# Patient Record
Sex: MALE | Race: WHITE | NOT HISPANIC OR LATINO
[De-identification: names, ages, dates, MRNs, and addresses within clinical notes are randomized per-mention and may not be internally consistent; named-entity substitution may affect disease eponyms.]

---

## 2017-02-10 ENCOUNTER — PRIOR ORIGINAL RECORDS (OUTPATIENT)
Dept: OTHER | Age: 52
End: 2017-02-10

## 2017-02-14 ENCOUNTER — PRIOR ORIGINAL RECORDS (OUTPATIENT)
Dept: OTHER | Age: 52
End: 2017-02-14

## 2017-02-14 LAB
ALBUMIN: 4.7 G/DL
ALKALINE PHOSPHATATE(ALK PHOS): 76 IU/L
ALT (SGPT): 43 U/L
ALT (SGPT): 43 U/L
AST (SGOT): 23 U/L
AST (SGOT): 23 U/L
BUN: 21 MG/DL
CALCIUM: 9.7 MG/DL
CHLORIDE: 98 MEQ/L
CHOLESTEROL, TOTAL: 227 MG/DL
CREATININE, SERUM: 1.11 MG/DL
GLUCOSE: 101 MG/DL
GLUCOSE: 101 MG/DL
HDL CHOLESTEROL: 52 MG/DL
HEMATOCRIT: 47.3 %
HEMOGLOBIN: 15.7 G/DL
LDL CHOLESTEROL: 142 MG/DL
NON-HDL CHOLESTEROL: 175 MG/DL
PLATELETS: 339 K/UL
POTASSIUM, SERUM: 4.6 MEQ/L
PROTEIN, TOTAL: 7.8 G/DL
RED BLOOD COUNT: 5.8 X 10-6/U
SGOT (AST): 23 IU/L
SGPT (ALT): 43 IU/L
SODIUM: 137 MEQ/L
THYROID STIMULATING HORMONE: 1.45 MLU/L
THYROID STIMULATING HORMONE: 1.45 MLU/L
TRIGLYCERIDES: 165 MG/DL
VITAMIN D 25-OH: 26.5 NG/ML
WHITE BLOOD COUNT: 10.16 X 10-3/U

## 2017-02-17 ENCOUNTER — TELEPHONE (OUTPATIENT)
Dept: SURGERY | Facility: CLINIC | Age: 52
End: 2017-02-17

## 2017-02-17 NOTE — TELEPHONE ENCOUNTER
Per 's request, provided him with pt's faxed test results (see pt's \"my Chart. \"), as well as placed copy to scan in. No fov scheduled.  asked that I mail pt copy of u/a requisition order, that he may use at his own lab.  Placed this in outgoin

## 2017-02-17 NOTE — TELEPHONE ENCOUNTER
I sent patient the following message in \"my chart\";   it was in response to a message that he sent me. ..     Erma Rae,          On 2/10/17   your urinalysis dipstick (presumably performed a laboratory near your home in [place]) showed a small amount of micro

## 2017-05-09 ENCOUNTER — PRIOR ORIGINAL RECORDS (OUTPATIENT)
Dept: OTHER | Age: 52
End: 2017-05-09

## 2017-12-04 NOTE — TELEPHONE ENCOUNTER
Pending Prescriptions Disp Refills    PANTOPRAZOLE SODIUM 40 MG Oral Tab EC [Pharmacy Med Name: PANTOPRAZOLE SOD 40MG EC TABLET] 90 tablet      Sig: TAKE 1 TABLET BY MOUTH  EVERY MORNING BEFORE  BREAKFAST         See refill request  Patient last seen 10-

## 2017-12-05 RX ORDER — PANTOPRAZOLE SODIUM 40 MG/1
40 TABLET, DELAYED RELEASE ORAL
Qty: 90 TABLET | Refills: 0 | Status: SHIPPED | OUTPATIENT
Start: 2017-12-05 | End: 2018-02-23

## 2017-12-05 NOTE — TELEPHONE ENCOUNTER
Rx refilled for 90 days. Patient has not been seen in >1 year. Would advise an annual office visit for further refills. Alternatively, patient may seek additional refills from his PCP.

## 2018-01-30 ENCOUNTER — PRIOR ORIGINAL RECORDS (OUTPATIENT)
Dept: OTHER | Age: 53
End: 2018-01-30

## 2018-02-26 RX ORDER — PANTOPRAZOLE SODIUM 40 MG/1
TABLET, DELAYED RELEASE ORAL
Qty: 30 TABLET | Refills: 1 | Status: SHIPPED | OUTPATIENT
Start: 2018-02-26 | End: 2018-05-08

## 2018-02-26 NOTE — TELEPHONE ENCOUNTER
I called and spoke to pt, I asked him to verify his , and he refused since my number comes up as a call from Jos and he is not sure if I am who I say I am.   I apologized that I could not give him any information if he didn't verify  or any other

## 2018-02-26 NOTE — TELEPHONE ENCOUNTER
Rx refilled for 30 days +1 refill. Please see previous telephone template dated 35/0/7239. He has been >1 year since the patient was seen. Would recommend an annual office visit with the patient may alternatively seek additional refills from his PCP.

## 2018-02-26 NOTE — TELEPHONE ENCOUNTER
Pending Prescriptions Disp Refills    PANTOPRAZOLE SODIUM 40 MG Oral Tab EC [Pharmacy Med Name: PANTOPRAZOLE SOD 40MG EC TABLET] 90 tablet      Sig: TAKE 1 TABLET BY MOUTH  EVERY MORNING BEFORE  BREAKFAST         LOV: 12/3/17   LR: 12/5/17

## 2018-03-09 ENCOUNTER — PRIOR ORIGINAL RECORDS (OUTPATIENT)
Dept: OTHER | Age: 53
End: 2018-03-09

## 2018-03-20 ENCOUNTER — PRIOR ORIGINAL RECORDS (OUTPATIENT)
Dept: OTHER | Age: 53
End: 2018-03-20

## 2018-03-21 LAB
ALBUMIN: 4.6 G/DL
ALT (SGPT): 36 U/L
AST (SGOT): 21 U/L
BILIRUBIN TOTAL: 1.2 MG/DL
BILIRUBIN TOTAL: 1.2 MG/DL
BUN: 22 MG/DL
CALCIUM: 9.5 MG/DL
CHLORIDE: 100 MEQ/L
CHOLESTEROL, TOTAL: 172 MG/DL
CREATININE, SERUM: 0.98 MG/DL
GLUCOSE: 90 MG/DL
GLUCOSE: 90 MG/DL
HDL CHOLESTEROL: 53 MG/DL
HEMATOCRIT: 44.9 %
HEMATOCRIT: 44.9 %
HEMOGLOBIN: 14.7 G/DL
HEMOGLOBIN: 14.7 G/DL
INR: 1.1
LDL CHOLESTEROL: 99 MG/DL
NON-HDL CHOLESTEROL: 119 MG/DL
PLATELETS: 299 K/UL
PLATELETS: 299 K/UL
POTASSIUM, SERUM: 4.1 MEQ/L
PROTEIN, TOTAL: 7.4 G/DL
RED BLOOD COUNT: 5.52 X 10-6/U
SGOT (AST): 21 IU/L
SGPT (ALT): 36 IU/L
SODIUM: 139 MEQ/L
TRIGLYCERIDES: 102 MG/DL
WHITE BLOOD COUNT: 8.73 X 10-3/U

## 2018-04-02 ENCOUNTER — ANESTHESIA EVENT (OUTPATIENT)
Dept: SURGERY | Facility: HOSPITAL | Age: 53
DRG: 470 | End: 2018-04-02
Payer: COMMERCIAL

## 2018-04-04 ENCOUNTER — ANESTHESIA (OUTPATIENT)
Dept: SURGERY | Facility: HOSPITAL | Age: 53
DRG: 470 | End: 2018-04-04
Payer: COMMERCIAL

## 2018-04-04 ENCOUNTER — APPOINTMENT (OUTPATIENT)
Dept: GENERAL RADIOLOGY | Facility: HOSPITAL | Age: 53
DRG: 470 | End: 2018-04-04
Attending: NURSE PRACTITIONER
Payer: COMMERCIAL

## 2018-04-04 ENCOUNTER — SURGERY (OUTPATIENT)
Age: 53
End: 2018-04-04

## 2018-04-04 ENCOUNTER — APPOINTMENT (OUTPATIENT)
Dept: GENERAL RADIOLOGY | Facility: HOSPITAL | Age: 53
DRG: 470 | End: 2018-04-04
Attending: ORTHOPAEDIC SURGERY
Payer: COMMERCIAL

## 2018-04-04 ENCOUNTER — HOSPITAL ENCOUNTER (INPATIENT)
Facility: HOSPITAL | Age: 53
LOS: 3 days | Discharge: HOME HEALTH | DRG: 470 | End: 2018-04-07
Attending: ORTHOPAEDIC SURGERY | Admitting: ORTHOPAEDIC SURGERY
Payer: COMMERCIAL

## 2018-04-04 PROBLEM — M16.11 PRIMARY OSTEOARTHRITIS OF RIGHT HIP: Chronic | Status: ACTIVE | Noted: 2018-04-04

## 2018-04-04 PROBLEM — J45.909 ASTHMA (HCC): Chronic | Status: ACTIVE | Noted: 2018-04-04

## 2018-04-04 PROBLEM — J45.909 ASTHMA: Chronic | Status: ACTIVE | Noted: 2018-04-04

## 2018-04-04 PROBLEM — I48.0 PAROXYSMAL A-FIB (HCC): Chronic | Status: ACTIVE | Noted: 2018-04-04

## 2018-04-04 PROBLEM — M16.11 ARTHRITIS OF RIGHT HIP: Status: ACTIVE | Noted: 2018-04-04

## 2018-04-04 PROBLEM — E78.5 HYPERLIPIDEMIA: Chronic | Status: ACTIVE | Noted: 2018-04-04

## 2018-04-04 PROBLEM — I10 ESSENTIAL HYPERTENSION: Chronic | Status: ACTIVE | Noted: 2018-04-04

## 2018-04-04 PROCEDURE — 72170 X-RAY EXAM OF PELVIS: CPT | Performed by: NURSE PRACTITIONER

## 2018-04-04 PROCEDURE — 73501 X-RAY EXAM HIP UNI 1 VIEW: CPT | Performed by: ORTHOPAEDIC SURGERY

## 2018-04-04 PROCEDURE — 0SR90J9 REPLACEMENT OF RIGHT HIP JOINT WITH SYNTHETIC SUBSTITUTE, CEMENTED, OPEN APPROACH: ICD-10-PCS | Performed by: ORTHOPAEDIC SURGERY

## 2018-04-04 PROCEDURE — 99232 SBSQ HOSP IP/OBS MODERATE 35: CPT | Performed by: HOSPITALIST

## 2018-04-04 DEVICE — CEMENT BONE ZIM PALICOS R +G: Type: IMPLANTABLE DEVICE | Site: HIP | Status: FUNCTIONAL

## 2018-04-04 DEVICE — BONE SCREW 6.5X30 SELF-TAP: Type: IMPLANTABLE DEVICE | Site: HIP | Status: FUNCTIONAL

## 2018-04-04 DEVICE — BIOLOX® DELTA, CERAMIC FEMORAL HEAD, L, Ø 36/+3.5, TAPER 12/14
Type: IMPLANTABLE DEVICE | Site: HIP | Status: FUNCTIONAL
Brand: BIOLOX® DELTA

## 2018-04-04 DEVICE — BONE SCREW 6.5X35 SELF-TAP: Type: IMPLANTABLE DEVICE | Site: HIP | Status: FUNCTIONAL

## 2018-04-04 DEVICE — IMPLANTABLE DEVICE: Type: IMPLANTABLE DEVICE | Site: HIP | Status: FUNCTIONAL

## 2018-04-04 RX ORDER — METOPROLOL SUCCINATE 25 MG/1
25 TABLET, EXTENDED RELEASE ORAL DAILY
Status: DISCONTINUED | OUTPATIENT
Start: 2018-04-04 | End: 2018-04-04

## 2018-04-04 RX ORDER — SCOLOPAMINE TRANSDERMAL SYSTEM 1 MG/1
1 PATCH, EXTENDED RELEASE TRANSDERMAL ONCE
Status: COMPLETED | OUTPATIENT
Start: 2018-04-04 | End: 2018-04-07

## 2018-04-04 RX ORDER — SODIUM CHLORIDE 9 MG/ML
INJECTION, SOLUTION INTRAVENOUS CONTINUOUS PRN
Status: DISCONTINUED | OUTPATIENT
Start: 2018-04-04 | End: 2018-04-04 | Stop reason: SURG

## 2018-04-04 RX ORDER — TRAMADOL HYDROCHLORIDE 50 MG/1
100 TABLET ORAL EVERY 6 HOURS PRN
Status: DISCONTINUED | OUTPATIENT
Start: 2018-04-04 | End: 2018-04-07

## 2018-04-04 RX ORDER — MORPHINE SULFATE 4 MG/ML
4 INJECTION, SOLUTION INTRAMUSCULAR; INTRAVENOUS EVERY 2 HOUR PRN
Status: DISCONTINUED | OUTPATIENT
Start: 2018-04-04 | End: 2018-04-07

## 2018-04-04 RX ORDER — NALOXONE HYDROCHLORIDE 0.4 MG/ML
0.08 INJECTION, SOLUTION INTRAMUSCULAR; INTRAVENOUS; SUBCUTANEOUS
Status: ACTIVE | OUTPATIENT
Start: 2018-04-04 | End: 2018-04-05

## 2018-04-04 RX ORDER — CLINDAMYCIN PHOSPHATE 150 MG/ML
INJECTION, SOLUTION INTRAVENOUS AS NEEDED
Status: DISCONTINUED | OUTPATIENT
Start: 2018-04-04 | End: 2018-04-04 | Stop reason: SURG

## 2018-04-04 RX ORDER — HYDROCODONE BITARTRATE AND ACETAMINOPHEN 5; 325 MG/1; MG/1
2 TABLET ORAL AS NEEDED
Status: DISCONTINUED | OUTPATIENT
Start: 2018-04-04 | End: 2018-04-04 | Stop reason: HOSPADM

## 2018-04-04 RX ORDER — NALOXONE HYDROCHLORIDE 0.4 MG/ML
80 INJECTION, SOLUTION INTRAMUSCULAR; INTRAVENOUS; SUBCUTANEOUS AS NEEDED
Status: DISCONTINUED | OUTPATIENT
Start: 2018-04-04 | End: 2018-04-04 | Stop reason: HOSPADM

## 2018-04-04 RX ORDER — FAMOTIDINE 20 MG/1
20 TABLET ORAL 2 TIMES DAILY
Status: DISCONTINUED | OUTPATIENT
Start: 2018-04-04 | End: 2018-04-07

## 2018-04-04 RX ORDER — SODIUM CHLORIDE, SODIUM LACTATE, POTASSIUM CHLORIDE, CALCIUM CHLORIDE 600; 310; 30; 20 MG/100ML; MG/100ML; MG/100ML; MG/100ML
INJECTION, SOLUTION INTRAVENOUS CONTINUOUS
Status: DISCONTINUED | OUTPATIENT
Start: 2018-04-04 | End: 2018-04-04 | Stop reason: HOSPADM

## 2018-04-04 RX ORDER — SODIUM PHOSPHATE, DIBASIC AND SODIUM PHOSPHATE, MONOBASIC 7; 19 G/133ML; G/133ML
1 ENEMA RECTAL ONCE AS NEEDED
Status: DISCONTINUED | OUTPATIENT
Start: 2018-04-04 | End: 2018-04-07

## 2018-04-04 RX ORDER — ACETAMINOPHEN 325 MG/1
650 TABLET ORAL EVERY 4 HOURS PRN
Status: DISCONTINUED | OUTPATIENT
Start: 2018-04-04 | End: 2018-04-07

## 2018-04-04 RX ORDER — MIDAZOLAM HYDROCHLORIDE 1 MG/ML
INJECTION INTRAMUSCULAR; INTRAVENOUS AS NEEDED
Status: DISCONTINUED | OUTPATIENT
Start: 2018-04-04 | End: 2018-04-04 | Stop reason: SURG

## 2018-04-04 RX ORDER — METOPROLOL TARTRATE 5 MG/5ML
2.5 INJECTION INTRAVENOUS ONCE
Status: DISCONTINUED | OUTPATIENT
Start: 2018-04-04 | End: 2018-04-04 | Stop reason: HOSPADM

## 2018-04-04 RX ORDER — SODIUM CHLORIDE 0.9 % (FLUSH) 0.9 %
10 SYRINGE (ML) INJECTION AS NEEDED
Status: DISCONTINUED | OUTPATIENT
Start: 2018-04-04 | End: 2018-04-07

## 2018-04-04 RX ORDER — DIPHENHYDRAMINE HCL 25 MG
25 CAPSULE ORAL EVERY 4 HOURS PRN
Status: ACTIVE | OUTPATIENT
Start: 2018-04-04 | End: 2018-04-05

## 2018-04-04 RX ORDER — MAGNESIUM HYDROXIDE 1200 MG/15ML
LIQUID ORAL CONTINUOUS PRN
Status: DISCONTINUED | OUTPATIENT
Start: 2018-04-04 | End: 2018-04-04

## 2018-04-04 RX ORDER — HETASTARCH 6 G/100ML
500 INJECTION, SOLUTION INTRAVENOUS ONCE
Status: COMPLETED | OUTPATIENT
Start: 2018-04-04 | End: 2018-04-04

## 2018-04-04 RX ORDER — MORPHINE SULFATE 2 MG/ML
2 INJECTION, SOLUTION INTRAMUSCULAR; INTRAVENOUS EVERY 2 HOUR PRN
Status: DISCONTINUED | OUTPATIENT
Start: 2018-04-04 | End: 2018-04-07

## 2018-04-04 RX ORDER — TRAMADOL HYDROCHLORIDE 50 MG/1
50 TABLET ORAL EVERY 6 HOURS PRN
Status: DISCONTINUED | OUTPATIENT
Start: 2018-04-04 | End: 2018-04-07

## 2018-04-04 RX ORDER — MORPHINE SULFATE 10 MG/ML
6 INJECTION, SOLUTION INTRAMUSCULAR; INTRAVENOUS EVERY 10 MIN PRN
Status: DISCONTINUED | OUTPATIENT
Start: 2018-04-04 | End: 2018-04-04 | Stop reason: HOSPADM

## 2018-04-04 RX ORDER — HALOPERIDOL 5 MG/ML
0.5 INJECTION INTRAMUSCULAR ONCE AS NEEDED
Status: ACTIVE | OUTPATIENT
Start: 2018-04-04 | End: 2018-04-04

## 2018-04-04 RX ORDER — METOCLOPRAMIDE 10 MG/1
10 TABLET ORAL ONCE
Status: DISCONTINUED | OUTPATIENT
Start: 2018-04-04 | End: 2018-04-04 | Stop reason: HOSPADM

## 2018-04-04 RX ORDER — BUPIVACAINE HYDROCHLORIDE 5 MG/ML
INJECTION, SOLUTION EPIDURAL; INTRACAUDAL AS NEEDED
Status: DISCONTINUED | OUTPATIENT
Start: 2018-04-04 | End: 2018-04-04 | Stop reason: SURG

## 2018-04-04 RX ORDER — METOPROLOL SUCCINATE 25 MG/1
25 TABLET, EXTENDED RELEASE ORAL DAILY
Status: DISCONTINUED | OUTPATIENT
Start: 2018-04-04 | End: 2018-04-05

## 2018-04-04 RX ORDER — ATORVASTATIN CALCIUM 40 MG/1
40 TABLET, FILM COATED ORAL NIGHTLY
Status: DISCONTINUED | OUTPATIENT
Start: 2018-04-04 | End: 2018-04-07

## 2018-04-04 RX ORDER — POLYETHYLENE GLYCOL 3350 17 G/17G
17 POWDER, FOR SOLUTION ORAL DAILY PRN
Status: DISCONTINUED | OUTPATIENT
Start: 2018-04-04 | End: 2018-04-07

## 2018-04-04 RX ORDER — FLECAINIDE ACETATE 100 MG/1
100 TABLET ORAL 2 TIMES DAILY
Status: DISCONTINUED | OUTPATIENT
Start: 2018-04-04 | End: 2018-04-07

## 2018-04-04 RX ORDER — HYDROMORPHONE HYDROCHLORIDE 1 MG/ML
0.6 INJECTION, SOLUTION INTRAMUSCULAR; INTRAVENOUS; SUBCUTANEOUS EVERY 5 MIN PRN
Status: DISCONTINUED | OUTPATIENT
Start: 2018-04-04 | End: 2018-04-04 | Stop reason: HOSPADM

## 2018-04-04 RX ORDER — HYDROCODONE BITARTRATE AND ACETAMINOPHEN 7.5; 325 MG/1; MG/1
2 TABLET ORAL EVERY 6 HOURS PRN
Status: DISCONTINUED | OUTPATIENT
Start: 2018-04-04 | End: 2018-04-06

## 2018-04-04 RX ORDER — DIPHENHYDRAMINE HYDROCHLORIDE 50 MG/ML
12.5 INJECTION INTRAMUSCULAR; INTRAVENOUS EVERY 4 HOURS PRN
Status: ACTIVE | OUTPATIENT
Start: 2018-04-04 | End: 2018-04-05

## 2018-04-04 RX ORDER — HYDROCODONE BITARTRATE AND ACETAMINOPHEN 7.5; 325 MG/1; MG/1
1 TABLET ORAL EVERY 6 HOURS PRN
Status: DISCONTINUED | OUTPATIENT
Start: 2018-04-04 | End: 2018-04-06

## 2018-04-04 RX ORDER — ACETAMINOPHEN 325 MG/1
650 TABLET ORAL EVERY 6 HOURS PRN
Status: DISCONTINUED | OUTPATIENT
Start: 2018-04-04 | End: 2018-04-07

## 2018-04-04 RX ORDER — HYDROMORPHONE HYDROCHLORIDE 1 MG/ML
0.2 INJECTION, SOLUTION INTRAMUSCULAR; INTRAVENOUS; SUBCUTANEOUS EVERY 5 MIN PRN
Status: DISCONTINUED | OUTPATIENT
Start: 2018-04-04 | End: 2018-04-04 | Stop reason: HOSPADM

## 2018-04-04 RX ORDER — MORPHINE SULFATE 2 MG/ML
2 INJECTION, SOLUTION INTRAMUSCULAR; INTRAVENOUS EVERY 10 MIN PRN
Status: DISCONTINUED | OUTPATIENT
Start: 2018-04-04 | End: 2018-04-04 | Stop reason: HOSPADM

## 2018-04-04 RX ORDER — GLYCOPYRROLATE 0.2 MG/ML
INJECTION INTRAMUSCULAR; INTRAVENOUS AS NEEDED
Status: DISCONTINUED | OUTPATIENT
Start: 2018-04-04 | End: 2018-04-04 | Stop reason: SURG

## 2018-04-04 RX ORDER — MORPHINE SULFATE 2 MG/ML
1 INJECTION, SOLUTION INTRAMUSCULAR; INTRAVENOUS EVERY 2 HOUR PRN
Status: DISCONTINUED | OUTPATIENT
Start: 2018-04-04 | End: 2018-04-07

## 2018-04-04 RX ORDER — ZOLPIDEM TARTRATE 5 MG/1
5 TABLET ORAL NIGHTLY PRN
Status: DISCONTINUED | OUTPATIENT
Start: 2018-04-04 | End: 2018-04-07

## 2018-04-04 RX ORDER — ONDANSETRON 2 MG/ML
4 INJECTION INTRAMUSCULAR; INTRAVENOUS EVERY 4 HOURS PRN
Status: DISCONTINUED | OUTPATIENT
Start: 2018-04-04 | End: 2018-04-07

## 2018-04-04 RX ORDER — LIDOCAINE HYDROCHLORIDE 10 MG/ML
INJECTION, SOLUTION EPIDURAL; INFILTRATION; INTRACAUDAL; PERINEURAL AS NEEDED
Status: DISCONTINUED | OUTPATIENT
Start: 2018-04-04 | End: 2018-04-04 | Stop reason: SURG

## 2018-04-04 RX ORDER — HYDROMORPHONE HYDROCHLORIDE 1 MG/ML
0.4 INJECTION, SOLUTION INTRAMUSCULAR; INTRAVENOUS; SUBCUTANEOUS
Status: ACTIVE | OUTPATIENT
Start: 2018-04-04 | End: 2018-04-05

## 2018-04-04 RX ORDER — NALBUPHINE HCL 10 MG/ML
2.5 AMPUL (ML) INJECTION EVERY 4 HOURS PRN
Status: DISCONTINUED | OUTPATIENT
Start: 2018-04-04 | End: 2018-04-07

## 2018-04-04 RX ORDER — EPHEDRINE SULFATE 50 MG/ML
INJECTION, SOLUTION INTRAVENOUS AS NEEDED
Status: DISCONTINUED | OUTPATIENT
Start: 2018-04-04 | End: 2018-04-04 | Stop reason: SURG

## 2018-04-04 RX ORDER — ONDANSETRON 2 MG/ML
4 INJECTION INTRAMUSCULAR; INTRAVENOUS ONCE AS NEEDED
Status: DISCONTINUED | OUTPATIENT
Start: 2018-04-04 | End: 2018-04-04 | Stop reason: HOSPADM

## 2018-04-04 RX ORDER — METOCLOPRAMIDE HYDROCHLORIDE 5 MG/ML
INJECTION INTRAMUSCULAR; INTRAVENOUS AS NEEDED
Status: DISCONTINUED | OUTPATIENT
Start: 2018-04-04 | End: 2018-04-04 | Stop reason: SURG

## 2018-04-04 RX ORDER — MORPHINE SULFATE 1 MG/ML
INJECTION, SOLUTION EPIDURAL; INTRATHECAL; INTRAVENOUS AS NEEDED
Status: DISCONTINUED | OUTPATIENT
Start: 2018-04-04 | End: 2018-04-04 | Stop reason: SURG

## 2018-04-04 RX ORDER — HYDROCODONE BITARTRATE AND ACETAMINOPHEN 5; 325 MG/1; MG/1
1 TABLET ORAL AS NEEDED
Status: DISCONTINUED | OUTPATIENT
Start: 2018-04-04 | End: 2018-04-04 | Stop reason: HOSPADM

## 2018-04-04 RX ORDER — SODIUM CHLORIDE 9 MG/ML
INJECTION, SOLUTION INTRAVENOUS CONTINUOUS
Status: DISCONTINUED | OUTPATIENT
Start: 2018-04-04 | End: 2018-04-07

## 2018-04-04 RX ORDER — CELECOXIB 200 MG/1
200 CAPSULE ORAL ONCE
Status: COMPLETED | OUTPATIENT
Start: 2018-04-04 | End: 2018-04-04

## 2018-04-04 RX ORDER — SODIUM CHLORIDE, SODIUM LACTATE, POTASSIUM CHLORIDE, CALCIUM CHLORIDE 600; 310; 30; 20 MG/100ML; MG/100ML; MG/100ML; MG/100ML
INJECTION, SOLUTION INTRAVENOUS CONTINUOUS
Status: DISCONTINUED | OUTPATIENT
Start: 2018-04-04 | End: 2018-04-06

## 2018-04-04 RX ORDER — METOCLOPRAMIDE HYDROCHLORIDE 5 MG/ML
10 INJECTION INTRAMUSCULAR; INTRAVENOUS EVERY 6 HOURS PRN
Status: ACTIVE | OUTPATIENT
Start: 2018-04-04 | End: 2018-04-06

## 2018-04-04 RX ORDER — MORPHINE SULFATE 4 MG/ML
4 INJECTION, SOLUTION INTRAMUSCULAR; INTRAVENOUS EVERY 10 MIN PRN
Status: DISCONTINUED | OUTPATIENT
Start: 2018-04-04 | End: 2018-04-04 | Stop reason: HOSPADM

## 2018-04-04 RX ORDER — HYDROMORPHONE HYDROCHLORIDE 1 MG/ML
0.4 INJECTION, SOLUTION INTRAMUSCULAR; INTRAVENOUS; SUBCUTANEOUS EVERY 5 MIN PRN
Status: DISCONTINUED | OUTPATIENT
Start: 2018-04-04 | End: 2018-04-04 | Stop reason: HOSPADM

## 2018-04-04 RX ORDER — HYDROMORPHONE HYDROCHLORIDE 1 MG/ML
0.6 INJECTION, SOLUTION INTRAMUSCULAR; INTRAVENOUS; SUBCUTANEOUS
Status: ACTIVE | OUTPATIENT
Start: 2018-04-04 | End: 2018-04-05

## 2018-04-04 RX ORDER — FAMOTIDINE 20 MG/1
20 TABLET ORAL ONCE
Status: DISCONTINUED | OUTPATIENT
Start: 2018-04-04 | End: 2018-04-04 | Stop reason: HOSPADM

## 2018-04-04 RX ORDER — ONDANSETRON 2 MG/ML
INJECTION INTRAMUSCULAR; INTRAVENOUS AS NEEDED
Status: DISCONTINUED | OUTPATIENT
Start: 2018-04-04 | End: 2018-04-04 | Stop reason: SURG

## 2018-04-04 RX ORDER — BISACODYL 10 MG
10 SUPPOSITORY, RECTAL RECTAL
Status: DISCONTINUED | OUTPATIENT
Start: 2018-04-04 | End: 2018-04-07

## 2018-04-04 RX ORDER — KETOROLAC TROMETHAMINE 30 MG/ML
30 INJECTION, SOLUTION INTRAMUSCULAR; INTRAVENOUS ONCE
Status: COMPLETED | OUTPATIENT
Start: 2018-04-04 | End: 2018-04-04

## 2018-04-04 RX ORDER — CLINDAMYCIN PHOSPHATE 900 MG/50ML
900 INJECTION INTRAVENOUS ONCE
Status: DISCONTINUED | OUTPATIENT
Start: 2018-04-04 | End: 2018-04-04 | Stop reason: HOSPADM

## 2018-04-04 RX ORDER — FAMOTIDINE 10 MG/ML
20 INJECTION, SOLUTION INTRAVENOUS 2 TIMES DAILY
Status: DISCONTINUED | OUTPATIENT
Start: 2018-04-04 | End: 2018-04-07

## 2018-04-04 RX ORDER — BUPIVACAINE HYDROCHLORIDE 2.5 MG/ML
INJECTION, SOLUTION EPIDURAL; INFILTRATION; INTRACAUDAL AS NEEDED
Status: DISCONTINUED | OUTPATIENT
Start: 2018-04-04 | End: 2018-04-04 | Stop reason: HOSPADM

## 2018-04-04 RX ORDER — DOCUSATE SODIUM 100 MG/1
100 CAPSULE, LIQUID FILLED ORAL 2 TIMES DAILY
Status: DISCONTINUED | OUTPATIENT
Start: 2018-04-04 | End: 2018-04-07

## 2018-04-04 RX ORDER — PANTOPRAZOLE SODIUM 40 MG/1
40 TABLET, DELAYED RELEASE ORAL
Status: DISCONTINUED | OUTPATIENT
Start: 2018-04-05 | End: 2018-04-07

## 2018-04-04 RX ORDER — ONDANSETRON 2 MG/ML
4 INJECTION INTRAMUSCULAR; INTRAVENOUS ONCE AS NEEDED
Status: ACTIVE | OUTPATIENT
Start: 2018-04-04 | End: 2018-04-04

## 2018-04-04 RX ORDER — ACETAMINOPHEN 500 MG
1000 TABLET ORAL ONCE
Status: COMPLETED | OUTPATIENT
Start: 2018-04-04 | End: 2018-04-04

## 2018-04-04 RX ADMIN — LIDOCAINE HYDROCHLORIDE 50 MG: 10 INJECTION, SOLUTION EPIDURAL; INFILTRATION; INTRACAUDAL; PERINEURAL at 12:15:00

## 2018-04-04 RX ADMIN — SODIUM CHLORIDE, SODIUM LACTATE, POTASSIUM CHLORIDE, CALCIUM CHLORIDE: 600; 310; 30; 20 INJECTION, SOLUTION INTRAVENOUS at 15:00:00

## 2018-04-04 RX ADMIN — EPHEDRINE SULFATE 5 MG: 50 INJECTION, SOLUTION INTRAVENOUS at 12:50:00

## 2018-04-04 RX ADMIN — BUPIVACAINE HYDROCHLORIDE 2.1 ML: 5 INJECTION, SOLUTION EPIDURAL; INTRACAUDAL at 12:07:00

## 2018-04-04 RX ADMIN — SODIUM CHLORIDE, SODIUM LACTATE, POTASSIUM CHLORIDE, CALCIUM CHLORIDE: 600; 310; 30; 20 INJECTION, SOLUTION INTRAVENOUS at 13:45:00

## 2018-04-04 RX ADMIN — CLINDAMYCIN PHOSPHATE 900 MG: 150 INJECTION, SOLUTION INTRAVENOUS at 12:22:00

## 2018-04-04 RX ADMIN — MIDAZOLAM HYDROCHLORIDE 2 MG: 1 INJECTION INTRAMUSCULAR; INTRAVENOUS at 12:02:00

## 2018-04-04 RX ADMIN — ONDANSETRON 4 MG: 2 INJECTION INTRAMUSCULAR; INTRAVENOUS at 12:56:00

## 2018-04-04 RX ADMIN — LIDOCAINE HYDROCHLORIDE 25 MG: 10 INJECTION, SOLUTION EPIDURAL; INFILTRATION; INTRACAUDAL; PERINEURAL at 12:04:00

## 2018-04-04 RX ADMIN — SODIUM CHLORIDE: 9 INJECTION, SOLUTION INTRAVENOUS at 15:00:00

## 2018-04-04 RX ADMIN — METOCLOPRAMIDE HYDROCHLORIDE 5 MG: 5 INJECTION INTRAMUSCULAR; INTRAVENOUS at 12:52:00

## 2018-04-04 RX ADMIN — SODIUM CHLORIDE, SODIUM LACTATE, POTASSIUM CHLORIDE, CALCIUM CHLORIDE: 600; 310; 30; 20 INJECTION, SOLUTION INTRAVENOUS at 11:50:00

## 2018-04-04 RX ADMIN — MIDAZOLAM HYDROCHLORIDE 2 MG: 1 INJECTION INTRAMUSCULAR; INTRAVENOUS at 11:56:00

## 2018-04-04 RX ADMIN — SODIUM CHLORIDE, SODIUM LACTATE, POTASSIUM CHLORIDE, CALCIUM CHLORIDE: 600; 310; 30; 20 INJECTION, SOLUTION INTRAVENOUS at 12:37:00

## 2018-04-04 RX ADMIN — GLYCOPYRROLATE 0.1 MG: 0.2 INJECTION INTRAMUSCULAR; INTRAVENOUS at 11:56:00

## 2018-04-04 RX ADMIN — MORPHINE SULFATE 0.3 MG: 1 INJECTION, SOLUTION EPIDURAL; INTRATHECAL; INTRAVENOUS at 12:07:00

## 2018-04-04 RX ADMIN — EPHEDRINE SULFATE 5 MG: 50 INJECTION, SOLUTION INTRAVENOUS at 13:12:00

## 2018-04-04 RX ADMIN — EPHEDRINE SULFATE 5 MG: 50 INJECTION, SOLUTION INTRAVENOUS at 12:56:00

## 2018-04-04 RX ADMIN — SODIUM CHLORIDE: 9 INJECTION, SOLUTION INTRAVENOUS at 12:32:00

## 2018-04-04 NOTE — H&P
History & Physical Examination    Patient Name: Mat Fernandez  MRN: [id number]  CSN: [id number]  YOB: 1965    Diagnosis: right hip arthritis    Present Illness: 53 y/o male with history of right proximal femur fracture followed by multiple philip Diagnosis Date   • Arrhythmia     uses Metoprolol   • Atrial fibrillation [facility])    • Back pain    • Back problem    • BPH (benign prostatic hypertrophy)    • Esophageal reflux    • Extrinsic asthma, unspecified    • History of cystoscopy    • Hypercholes

## 2018-04-04 NOTE — OPERATIVE REPORT
[facility]    Operative Note    Marita Fernandez Patient Status:  Surgery Admit    1965 MRN [id number]   Location [place] [address] Attending Ainsley España MD     PCP Unknown Pcp       Preop DX: right  Hip post-traumat

## 2018-04-04 NOTE — ANESTHESIA POSTPROCEDURE EVALUATION
Patient: Jason Fernandez    Procedure Summary     Date:  04/04/18 Room / Location:  [facility] OR [address] [place] [address] OR    Anesthesia Start:  9588 Anesthesia Stop:  2782    Procedure:  HIP TOTAL REPLACEMENT (Right Hip) Diagnosis:  (Degenerative joint disease  RIGHT

## 2018-04-04 NOTE — ANESTHESIA PREPROCEDURE EVALUATION
Anesthesia PreOp Note    HPI:     Tadeo Fernandez is a 52year old male who presents for preoperative consultation requested by: Liberty Liu MD    Date of Surgery: 4/4/2018    Procedure(s):  HIP TOTAL REPLACEMENT  Indication: Degenerative joint disease Last Rate: 20 mL/hr at 04/04/18 1101    metoprolol Tartrate (LOPRESSOR) tab 25 mg 25 mg Oral Once PRN Sandra Ji MD     famoTIDine (PEPCID) tab 20 mg 20 mg Oral Once Sandra Ji MD     Metoclopramide HCl (REGLAN) tab 10 mg 10 mg Oral Once Natalee Monsivais 98.4 °F (36.9 °C)   TempSrc:  Oral   SpO2:  100%   Weight: 78.5 kg (173 lb) 78.5 kg (173 lb 2 oz)   Height: 1.778 m (5' 10\") 1.778 m (5' 10\")        Anesthesia ROS/Med Hx and Physical Exam     Patient summary reviewed and Nursing notes reviewed    Airway

## 2018-04-04 NOTE — ANESTHESIA PROCEDURE NOTES
Spinal Block  Performed by: SHRADDHA BARBER  Authorized by: Mary Ann Castano     Start Time:  4/4/2018 12:05 PM  End Time:  4/4/2018 12:07 PM  Reason for Block: surgical anesthesia    Anesthesiologist:  SHRADDHA BARBER  Performed by:   Anesthesiologist  aRfaela

## 2018-04-04 NOTE — OPERATIVE REPORT
[facility] - [facility]    Operative Note    Yuridia Delauren Less Patient Status:  Surgery Admit    1965 MRN [id number]   Location [facility] 45 Attending Laura Ochoa MD     PCP Unknown Pcp       Preop DX: right  Hip post-traumat supine on the operating room table. He was then positioned in a lateral decubitus position with the right hip facing up. All bony prominences were well-padded on the left side.   His right lower extremity was then sterilely prepped and draped in standard to the posterior superior quadrant of the cup obtaining good purchase. We then placed a 36 mm trial liner and directed our attention towards the femur. The femur was elevated from the wound in the lateral aspect of the femoral neck was debrided.   We th impingement. We then irrigated with pulsatile lavage again and directed our attention towards closure. CLOSURE:    The capsule short external rotators were closed with #5 Ethibond suture and interfering fashion.   The deep fascia was closed with #2 Quil

## 2018-04-04 NOTE — PROGRESS NOTES
[facility]    Progress Note    Jeannie Fernandez Patient Status:  Surgery Admit    1965 MRN [id number]   Location [address] [facility] Attending Aziza Green MD   Hosp Day # 0 PCP Unknown Pcp     Subjective: CREATSERUM, BUN, NA, K, CL, CO2, GLU, CA, ALB, ALKPHO, BILT, TP, AST, ALT, PTT, INR, PT, T4F, TSH, TSHREFLEX, EAMON, LIP, GGT, PSA, DDIMER, ESRML, ESRPF, CRP, BNP, MG, PHOS, TROP, CK, CKMB, ELDER, RPR, B12, ETOH, POCGLU    Xr Hip W Or Wo Pelvis 1 View, Right (

## 2018-04-05 ENCOUNTER — APPOINTMENT (OUTPATIENT)
Dept: GENERAL RADIOLOGY | Facility: HOSPITAL | Age: 53
DRG: 470 | End: 2018-04-05
Attending: HOSPITALIST
Payer: COMMERCIAL

## 2018-04-05 PROCEDURE — 99233 SBSQ HOSP IP/OBS HIGH 50: CPT | Performed by: HOSPITALIST

## 2018-04-05 PROCEDURE — 71046 X-RAY EXAM CHEST 2 VIEWS: CPT | Performed by: HOSPITALIST

## 2018-04-05 RX ORDER — SULFAMETHOXAZOLE AND TRIMETHOPRIM 800; 160 MG/1; MG/1
1 TABLET ORAL EVERY 12 HOURS SCHEDULED
Status: DISCONTINUED | OUTPATIENT
Start: 2018-04-05 | End: 2018-04-07

## 2018-04-05 RX ORDER — METOPROLOL SUCCINATE 25 MG/1
25 TABLET, EXTENDED RELEASE ORAL DAILY
Status: DISCONTINUED | OUTPATIENT
Start: 2018-04-05 | End: 2018-04-06

## 2018-04-05 NOTE — OCCUPATIONAL THERAPY NOTE
OCCUPATIONAL THERAPY EVALUATION - INPATIENT      Room Number: 102/567-I  Evaluation Date: 4/5/2018  Type of Evaluation: Initial  Presenting Problem: r thr    Physician Order: IP Consult to Occupational Therapy  Reason for Therapy: ADL/IADL Dysfunction and Hyperlipidemia    Paroxysmal A-fib (HCC)    Asthma    Arthritis of right hip      Past Medical History  Past Medical History:   Diagnosis Date   • Arrhythmia     uses Metoprolol   • Atrial fibrillation [facility])    • Back pain    • Back problem    • BPH (agusto pain    COGNITION  Alert and oriented x 4    RANGE OF MOTION   Upper extremity ROM is within functional limits     STRENGTH ASSESSMENT  Upper extremity strength is within functional limits     ADDITIONAL TESTS                                    NEUROLOGICA will independently recall posterior hip precautions  Comment:         Goals  on:18  Frequency:1 visit

## 2018-04-05 NOTE — CM/SW NOTE
ALLEN met with the pt. And his wife At bedside. The pt. Lives with his wife in a 2 story home with the master on the main level. The pt. Reports being independent prior to admission with adls, ambulation and driving. The pt. Has a dtr. Living in the area.

## 2018-04-05 NOTE — PHYSICAL THERAPY NOTE
PHYSICAL THERAPY HIP EVALUATION - INPATIENT     Room Number: 803/166-S  Evaluation Date: 4/5/2018  Type of Evaluation: Initial  Physician Order: PT Eval and Treat    Presenting Problem: THR RLE  Reason for Therapy: Mobility Dysfunction and Discharge Planni COLONOSCOPY WITH BIOPSY  1979: ORCHIOPEXY        Comment: DETORSION OF TESTICLE  1982: OTHER Left      Comment: INTERNAL FIXATION TIBIA/FIBULA  No date: OTHER SURGICAL HISTORY Right      Comment: BONE LENGTHENING PX ON RT FEMUR  04/04/2018: TOTAL HIP REPLA with arms (e.g., wheelchair, bedside commode, etc.): A Little   -   Moving from lying on back to sitting on the side of the bed?: A Little   How much help from another person does the patient currently need. ..   -   Moving to and from a bed to a chair (inc concerns independently   Goal #5   Current Status    Goal #6 Patient independently performs home exercise program for ROM/strengthening per the instructions provided in preparation for discharge.    Goal #6  Current Status

## 2018-04-05 NOTE — PROGRESS NOTES
[facility]    Progress Note    Richard Fernandez Patient Status:  Inpatient    1965 MRN [id number]   Location [facility] 4W/SW/SE Attending Arthur Nava MD   Hosp Day # 1 PCP Unknown Pcp       Subjective:   Richard Fernandez is a(n tomorrow.     Results:     Recent Labs   Lab  04/05/18 0418   RBC  4.49*   HGB  11.9*   HCT  36.1*   MCV  80.4   MCH  26.5*   MCHC  33.0   RDW  14.2   WBC  8.5   PLT  187       Recent Labs   Lab  04/05/18 0418   GLU  95   BUN  12   CREATSERUM  1.13   GF

## 2018-04-05 NOTE — ANESTHESIA POST-OP FOLLOW-UP NOTE
[facility]   Acute Pain Rounds Note  2018    Patient name: Jovanni Fernandez 52year old male  : 1965  MRN: [id number]    Diagnosis: No diagnosis found.     S/P: rt jagjit    Pain modality: Duramorph    Current hospital day: Hospital Day

## 2018-04-05 NOTE — PROGRESS NOTES
[place] [facility]    Progress Note    Marita Fernandez Patient Status:  Inpatient    1965 MRN [id number]   Location [facility] 4W/SW/SE Attending Lacy Tsai Day # 1 PCP Unknown Pcp     Subjective:     Constitutio toilet  resp therapy; we discuss bactrim side effects; no previous bactrim reaction    Xray images reviewed  45 min spent on pt of which 30 min spent coordinating care with nurse and counseling pt and family in room with his permission about   Fever/ poss francisca placements are seen in the shaft of the proximal femur. * There has been resection of the femoral head/neck. * Old fracture deformity of the proximal one third of the femur are noted. * Images were available for review. Dictated by (CST):  Shelly Schneider

## 2018-04-05 NOTE — PHYSICAL THERAPY NOTE
PT PM session: Gait and transfer education performed with a RW with a step through gait pattern. Pt demonstrated mod indep mobility on and of the toilet and in and out of chair.  Pt reported 8/10 pain in the PM, RN was notified and pt was premedicated prior

## 2018-04-06 PROCEDURE — 99233 SBSQ HOSP IP/OBS HIGH 50: CPT | Performed by: HOSPITALIST

## 2018-04-06 RX ORDER — OXYCODONE HCL 10 MG/1
10 TABLET, FILM COATED, EXTENDED RELEASE ORAL EVERY 12 HOURS
Status: DISCONTINUED | OUTPATIENT
Start: 2018-04-06 | End: 2018-04-07

## 2018-04-06 RX ORDER — MELATONIN
325
Status: DISCONTINUED | OUTPATIENT
Start: 2018-04-07 | End: 2018-04-07

## 2018-04-06 RX ORDER — HYDROCODONE BITARTRATE AND ACETAMINOPHEN 10; 325 MG/1; MG/1
2 TABLET ORAL EVERY 4 HOURS PRN
Status: DISCONTINUED | OUTPATIENT
Start: 2018-04-06 | End: 2018-04-07

## 2018-04-06 RX ORDER — OXYCODONE HYDROCHLORIDE 5 MG/1
5 TABLET ORAL EVERY 4 HOURS PRN
Status: DISCONTINUED | OUTPATIENT
Start: 2018-04-06 | End: 2018-04-07

## 2018-04-06 RX ORDER — MAGNESIUM OXIDE 400 MG (241.3 MG MAGNESIUM) TABLET
400 TABLET ONCE
Status: COMPLETED | OUTPATIENT
Start: 2018-04-06 | End: 2018-04-06

## 2018-04-06 RX ORDER — HYDROCODONE BITARTRATE AND ACETAMINOPHEN 10; 325 MG/1; MG/1
1 TABLET ORAL EVERY 4 HOURS PRN
Status: DISCONTINUED | OUTPATIENT
Start: 2018-04-06 | End: 2018-04-07

## 2018-04-06 RX ORDER — METOPROLOL SUCCINATE 25 MG/1
12.5 TABLET, EXTENDED RELEASE ORAL EVERY 24 HOURS
Status: DISCONTINUED | OUTPATIENT
Start: 2018-04-06 | End: 2018-04-07

## 2018-04-06 NOTE — OCCUPATIONAL THERAPY NOTE
OCCUPATIONAL THERAPY TREATMENT NOTE - INPATIENT    Room Number: 304/715-O         Presenting Problem: r thr     Problem List  Active Problems:    Primary osteoarthritis of right hip    Essential hypertension    Hyperlipidemia    Paroxysmal A-fib ([address]    A clothing?: A Little  -   Bathing (including washing, rinsing, drying)?: A Little  -   Toileting, which includes using toilet, bedpan or urinal? : A Little  -   Putting on and taking off regular upper body clothing?: None  -   Taking care of personal groomi

## 2018-04-06 NOTE — PLAN OF CARE
DISCHARGE PLANNING    • Discharge to home or other facility with appropriate resources Progressing    yolie d/c planning-tenatively home with hhc vs outpatient PT        HEMATOLOGIC - ADULT    • Maintains hematologic stability Progressing    • Free from bleed

## 2018-04-06 NOTE — PROGRESS NOTES
[place] [facility] - [facility]    Progress Note    Breanna Primas Less Patient Status:  Inpatient    1965 MRN [id number]   Location [facility] 4W/SW/SE Attending Lacy Tsai Day # 2 PCP Unknown Pcp       Subjective:   Breanna Primas Less (cpt=71046)    Result Date: 4/5/2018  CONCLUSION:   Hypoinflated study. Bibasilar airspace disease posteriorly is favored to represent atelectasis. 1.3 cm opacity projecting over the right upper lobe. This may be infectious or inflammatory.  Additional fol

## 2018-04-06 NOTE — PROGRESS NOTES
[place] [facility] - [facility]    Progress Note    Leonard Fernandez Patient Status:  Inpatient    1965 MRN [id number]   Location [facility] 4W/SW/SE Attending Lacy Tsai Day # 2 PCP Unknown Pcp     Subjective:     Constitutio toilet  resp therapy; we discuss bactrim side effects; no previous bactrim reaction doing well       Expected iron defy anemia gave venofer    45 min spent on pt of which 30 min spent coordinating care with nurse and counseling pt and family in room with h

## 2018-04-06 NOTE — PHYSICAL THERAPY NOTE
Discussed with PTA, patient with posterior hip precautions per order and MD Vance Angelucci surgery note.

## 2018-04-07 VITALS
SYSTOLIC BLOOD PRESSURE: 106 MMHG | OXYGEN SATURATION: 95 % | TEMPERATURE: 99 F | WEIGHT: 173.13 LBS | DIASTOLIC BLOOD PRESSURE: 62 MMHG | HEART RATE: 92 BPM | HEIGHT: 70 IN | RESPIRATION RATE: 18 BRPM | BODY MASS INDEX: 24.79 KG/M2

## 2018-04-07 PROCEDURE — 99239 HOSP IP/OBS DSCHRG MGMT >30: CPT | Performed by: HOSPITALIST

## 2018-04-07 RX ORDER — POTASSIUM CHLORIDE 1.5 G/1.77G
20 POWDER, FOR SOLUTION ORAL 2 TIMES DAILY
Qty: 5 PACKET | Refills: 0 | Status: SHIPPED | OUTPATIENT
Start: 2018-04-07 | End: 2018-04-07

## 2018-04-07 RX ORDER — OXYCODONE HCL 10 MG/1
10 TABLET, FILM COATED, EXTENDED RELEASE ORAL EVERY 12 HOURS
Qty: 12 TABLET | Refills: 0 | Status: SHIPPED | OUTPATIENT
Start: 2018-04-07 | End: 2018-05-23 | Stop reason: ALTCHOICE

## 2018-04-07 RX ORDER — POTASSIUM CHLORIDE 20 MEQ/1
40 TABLET, EXTENDED RELEASE ORAL EVERY 4 HOURS
Status: COMPLETED | OUTPATIENT
Start: 2018-04-07 | End: 2018-04-07

## 2018-04-07 RX ORDER — HYDROCODONE BITARTRATE AND ACETAMINOPHEN 10; 325 MG/1; MG/1
1 TABLET ORAL EVERY 4 HOURS PRN
Qty: 30 TABLET | Refills: 0 | Status: SHIPPED | OUTPATIENT
Start: 2018-04-07 | End: 2018-05-23 | Stop reason: ALTCHOICE

## 2018-04-07 RX ORDER — POLYETHYLENE GLYCOL 3350 17 G/17G
17 POWDER, FOR SOLUTION ORAL DAILY
Qty: 30 EACH | Refills: 0 | Status: SHIPPED | OUTPATIENT
Start: 2018-04-07 | End: 2018-05-23 | Stop reason: ALTCHOICE

## 2018-04-07 RX ORDER — DOCUSATE SODIUM 100 MG/1
100 CAPSULE, LIQUID FILLED ORAL 2 TIMES DAILY PRN
Qty: 40 CAPSULE | Refills: 0 | Status: SHIPPED | OUTPATIENT
Start: 2018-04-07 | End: 2018-05-23 | Stop reason: ALTCHOICE

## 2018-04-07 RX ORDER — NALOXONE HYDROCHLORIDE 4 MG/.1ML
4 SPRAY, METERED NASAL AS NEEDED
Qty: 1 EACH | Refills: 0 | Status: SHIPPED | OUTPATIENT
Start: 2018-04-07 | End: 2018-05-23 | Stop reason: ALTCHOICE

## 2018-04-07 RX ORDER — SULFAMETHOXAZOLE AND TRIMETHOPRIM 800; 160 MG/1; MG/1
1 TABLET ORAL EVERY 12 HOURS SCHEDULED
Qty: 13 TABLET | Refills: 0 | Status: SHIPPED | OUTPATIENT
Start: 2018-04-07 | End: 2018-05-23

## 2018-04-07 RX ORDER — MAGNESIUM OXIDE 400 MG (241.3 MG MAGNESIUM) TABLET
400 TABLET ONCE
Status: COMPLETED | OUTPATIENT
Start: 2018-04-07 | End: 2018-04-07

## 2018-04-07 RX ORDER — MAGNESIUM SULFATE 1 G/100ML
1 INJECTION INTRAVENOUS ONCE
Status: COMPLETED | OUTPATIENT
Start: 2018-04-07 | End: 2018-04-07

## 2018-04-07 RX ORDER — POLYETHYLENE GLYCOL 3350 17 G/17G
17 POWDER, FOR SOLUTION ORAL DAILY
Status: DISCONTINUED | OUTPATIENT
Start: 2018-04-07 | End: 2018-04-07

## 2018-04-07 RX ORDER — POTASSIUM CHLORIDE 1.5 G/1.77G
20 POWDER, FOR SOLUTION ORAL DAILY
Qty: 5 PACKET | Refills: 0 | Status: SHIPPED | OUTPATIENT
Start: 2018-04-07 | End: 2018-05-23 | Stop reason: ALTCHOICE

## 2018-04-07 RX ORDER — MELATONIN
325 2 TIMES DAILY WITH MEALS
Qty: 60 TABLET | Refills: 0 | Status: SHIPPED | OUTPATIENT
Start: 2018-04-07 | End: 2018-05-23 | Stop reason: ALTCHOICE

## 2018-04-07 RX ORDER — BISACODYL 10 MG
10 SUPPOSITORY, RECTAL RECTAL DAILY
Qty: 12 SUPPOSITORY | Refills: 0 | Status: SHIPPED | OUTPATIENT
Start: 2018-04-07 | End: 2018-05-23 | Stop reason: ALTCHOICE

## 2018-04-07 RX ORDER — PSEUDOEPHEDRINE HCL 30 MG
100 TABLET ORAL 2 TIMES DAILY PRN
Qty: 40 CAPSULE | Refills: 0 | Status: SHIPPED | OUTPATIENT
Start: 2018-04-07 | End: 2018-04-07

## 2018-04-07 RX ORDER — POLYETHYLENE GLYCOL 3350 17 G/17G
17 POWDER, FOR SOLUTION ORAL DAILY PRN
Qty: 30 EACH | Refills: 0 | Status: SHIPPED | OUTPATIENT
Start: 2018-04-07 | End: 2018-04-07

## 2018-04-07 RX ORDER — OXYCODONE HYDROCHLORIDE 5 MG/1
5 TABLET ORAL EVERY 4 HOURS PRN
Qty: 12 TABLET | Refills: 0 | Status: SHIPPED | OUTPATIENT
Start: 2018-04-07 | End: 2018-05-23 | Stop reason: ALTCHOICE

## 2018-04-07 NOTE — DISCHARGE SUMMARY
More than 30 min spent on dc  Dc summary # [id number]  Hospital Discharge Diagnoses: jagjit    Lace+ Score: 41  59-90 High Risk  29-58 Medium Risk  0-28   Low Risk. TCM Follow-Up Recommendation:  LACE 29-58:  Moderate Risk of readmission after discharge from

## 2018-04-07 NOTE — CM/SW NOTE
SW received call stating pt will be needing HHC at home now and pt lives in [place]. SW [facility] [place]. Leydi Shukla unable to check pt's benefits until Monday, 4/9 - RN aware.     Katy Jj, [address]

## 2018-04-07 NOTE — PHYSICAL THERAPY NOTE
PHYSICAL THERAPY TREATMENT NOTE - INPATIENT     Room Number: 389/708-N       Presenting Problem: THR RLE    Problem List  Active Problems:    Primary osteoarthritis of right hip    Essential hypertension    Hyperlipidemia    Paroxysmal A-fib ([address]    Asthm Need to walk in hospital room?: A Little   -   Climbing 3-5 steps with a railing?: A Little     AM-PAC Score:  Raw Score: 21   PT Approx Degree of Impairment Score: 28.97%   Standardized Score (AM-PAC Scale): 50.25   CMS Modifier (G-Code): CJ    FUNCTIONAL

## 2018-04-09 NOTE — DISCHARGE SUMMARY
[facility]    PATIENT'S NAME: JESÚS BARBER   ATTENDING PHYSICIAN: Chasity Tsai MD   PATIENT ACCOUNT#:   [id number]    LOCATION:  [address] [place] [address] RECORD #:   [id number]       YOB: 1965  ADMISSION DATE:       04/04/2 discharge, temperature is 99, T-max is 100.6, respiratory rate 18, pulse 92, blood pressure is 106/62. LUNGS:  Completely clear. HEART:  Normal S1, S2. No S3, no S4, and regular. ABDOMEN:  Soft and nontender.   EXTREMITIES:  No neurovascular abnormaliti pressure is marginally low and too low to blindly add Lopressor or Cardizem at this time.   These episodes seem to be brief and very, very self-limited, i.e. the patient reports that he felt that he was in atrial fibrillation shortly before I examined him; No alcohol.  reviewed after that one as well. 4.   Aspirin 81 mg to resume when okay with Dr. Norah Benedict, probably when Xarelto is done. 5.   Flecainide 100 mg twice a day. May need to be adjusted by Dr. Kenzie Newsome.     6.   Advair Diskus 250/50 one puff tw

## 2018-04-20 NOTE — PAYOR COMM NOTE
--------------  ADMISSION REVIEW     Payor: [address] #:  [id number]  Authorization Number: [id number]    Admit date: 4/4/18  Admit time: 26       Admitting Physician: Fred Gipson MD  Attending Physician:  Garima leiva mouth nightly.    Disp:  Rfl:  4/3/2018 at Unknown time         Current Facility-Administered Medications:  lactated ringers infusion  Intravenous Continuous   metoprolol Tartrate (LOPRESSOR) tab 25 mg 25 mg Oral Once PRN   famoTIDine (PEPCID) tab 20 mg 20 [ x ] I have discussed the risks and benefits and alternatives with the patient/family. He understands that intraoperatively, we will test for infection and if positive, then would proceed with 2 stage exchange.   If infection is negative then will pro little bit more risky in the setting with multiple prior surgeries.   He still amenable to moving forward with the operation and there were no contraindications to proceed.     On 04/04/18, Aurora Fernandez was identified in the holding and taken to the operati atraumatic. Cardiovascular: Normal rate. Pulmonary/Chest: Effort normal. He has no wheezes. He has rales (left base). Abdominal: Soft. Bowel sounds are normal. He exhibits no distension. There is no tenderness.    Musculoskeletal: He exhibits tendern Clem Still MD on 4/05/2018 at 13:31           Xr Pelvis (1 View) (cpt=72170)     Result Date: 4/4/2018  CONCLUSION: Right hip arthroplasty.    Dictated by (CST): Meera Blevins MD on 4/04/2018 at 16:03     Approved by (CST): Meera Blevins MD on 4/04/2018 at 16: difficulty urinating. Musculoskeletal: Positive for joint swelling and joint pain. Neurological: Positive for weakness. Negative for light-headedness.    Psychiatric/Behavioral: The patient is not nervous/anxious.          Objective:   Blood pressure 13  04/06/2018   K 4.0 04/06/2018    04/06/2018   CO2 25 04/06/2018    (H) 04/06/2018   CA 8.1 (L) 04/06/2018   MG 1.6 (L) 04/06/2018         Xr Chest Pa + Lat Chest (dxd=15379)     Result Date: 4/5/2018  CONCLUSION:   Hypoinflated study. Status post total hip arthroplasty.     HISTORY AND HOSPITAL COURSE:  This is a 57-year-old white male who lives in [place] who presents with a history of coming in for a revision of a right hip after numerous nonsurgical treatments have failed to produce r post right total hip arthroplasty. I called Ms. Karen Chu,  Dr. Lyn FAUSTIN, and informed her that I am discharging the patient, so that she could come and comment on activity orders and some of the medications.   I found out the patient did not receive any kind, or Holter monitor, to be placed by Dr. Mel Stallings to determine how frequent these are. Continue flecainide; will continue home dose of Lopressor and correct electrolytes.   Would not want to add Cardizem or increase Lopressor because of low blood pressure needed because of prescription of narcotics. This is required because of the increased morphine equivalence. Nurse discussed with the patient about need for this, as it is required now for the amount of pain medications we are prescribing.   9.   Lipitor

## 2018-05-08 ENCOUNTER — TELEPHONE (OUTPATIENT)
Dept: GASTROENTEROLOGY | Facility: CLINIC | Age: 53
End: 2018-05-08

## 2018-05-08 RX ORDER — PANTOPRAZOLE SODIUM 40 MG/1
TABLET, DELAYED RELEASE ORAL
Qty: 30 TABLET | Refills: 0 | Status: SHIPPED | OUTPATIENT
Start: 2018-05-08 | End: 2018-05-21

## 2018-05-08 NOTE — TELEPHONE ENCOUNTER
Nursing: Please see previous patient email and telephone template regarding making an appointment. Patient has not been seen in >1 year. Short-term Rx sent.

## 2018-05-23 ENCOUNTER — OFFICE VISIT (OUTPATIENT)
Dept: GASTROENTEROLOGY | Facility: CLINIC | Age: 53
End: 2018-05-23

## 2018-05-23 VITALS
HEART RATE: 64 BPM | WEIGHT: 179.38 LBS | HEIGHT: 70 IN | DIASTOLIC BLOOD PRESSURE: 70 MMHG | BODY MASS INDEX: 25.68 KG/M2 | SYSTOLIC BLOOD PRESSURE: 105 MMHG

## 2018-05-23 DIAGNOSIS — K21.00 GASTROESOPHAGEAL REFLUX DISEASE WITH ESOPHAGITIS: Primary | ICD-10-CM

## 2018-05-23 PROCEDURE — 99212 OFFICE O/P EST SF 10 MIN: CPT | Performed by: NURSE PRACTITIONER

## 2018-05-23 PROCEDURE — 99213 OFFICE O/P EST LOW 20 MIN: CPT | Performed by: NURSE PRACTITIONER

## 2018-05-23 RX ORDER — ATORVASTATIN CALCIUM 40 MG/1
40 TABLET, FILM COATED ORAL DAILY
COMMUNITY
End: 2018-05-23

## 2018-05-23 RX ORDER — PANTOPRAZOLE SODIUM 40 MG/1
40 TABLET, DELAYED RELEASE ORAL
Qty: 90 TABLET | Refills: 3 | Status: SHIPPED | OUTPATIENT
Start: 2018-05-23 | End: 2019-05-27

## 2018-05-23 NOTE — PATIENT INSTRUCTIONS
1.  Pantoprazole refilled ×1 year  2. You will be due for your colonoscopy in February 2019 and should receive a letter in the mail a couple months in advance  3.   Follow-up in 1 year for a routine follow-up or as needed if any new symptoms arise

## 2018-08-09 ENCOUNTER — ANESTHESIA EVENT (OUTPATIENT)
Dept: SURGERY | Facility: HOSPITAL | Age: 53
DRG: 467 | End: 2018-08-09
Payer: COMMERCIAL

## 2018-08-10 ENCOUNTER — ANESTHESIA (OUTPATIENT)
Dept: SURGERY | Facility: HOSPITAL | Age: 53
DRG: 467 | End: 2018-08-10
Payer: COMMERCIAL

## 2018-08-10 ENCOUNTER — HOSPITAL ENCOUNTER (INPATIENT)
Facility: HOSPITAL | Age: 53
LOS: 5 days | Discharge: HOME | DRG: 467 | End: 2018-08-15
Attending: ORTHOPAEDIC SURGERY | Admitting: ORTHOPAEDIC SURGERY
Payer: COMMERCIAL

## 2018-08-10 ENCOUNTER — APPOINTMENT (OUTPATIENT)
Dept: GENERAL RADIOLOGY | Facility: HOSPITAL | Age: 53
DRG: 467 | End: 2018-08-10
Attending: ORTHOPAEDIC SURGERY
Payer: COMMERCIAL

## 2018-08-10 PROBLEM — T84.038A: Status: ACTIVE | Noted: 2018-08-10

## 2018-08-10 PROBLEM — Z96.649 ASEPTIC LOOSENING OF PROSTHETIC HIP: Status: ACTIVE | Noted: 2018-08-10

## 2018-08-10 PROBLEM — Z96.649 ASEPTIC LOOSENING OF PROSTHETIC HIP (HCC): Status: ACTIVE | Noted: 2018-08-10

## 2018-08-10 PROBLEM — T84.038A ASEPTIC LOOSENING OF PROSTHETIC HIP (HCC): Status: ACTIVE | Noted: 2018-08-10

## 2018-08-10 PROBLEM — Z96.649: Status: ACTIVE | Noted: 2018-08-10

## 2018-08-10 PROBLEM — T84.038A ASEPTIC LOOSENING OF PROSTHETIC HIP: Status: ACTIVE | Noted: 2018-08-10

## 2018-08-10 LAB
ANTIBODY SCREEN: NEGATIVE
BASOPHILS NFR FLD: 0 %
EOSINOPHIL NFR FLD: 0 %
GLUCOSE BLDC GLUCOMTR-MCNC: 118 MG/DL (ref 70–99)
LYMPHOCYTES NFR FLD: 54 %
MONOCYTES NFR FLD: 13 %
NEUTROPHILS NFR FLD: 33 %
POCT HEMOCUE: 9.9 (ref 13.5–17.5)
RBC # FLD: ABNORMAL /CUMM (ref ?–1)
RH BLOOD TYPE: POSITIVE
WBC # FLD: 918 /CUMM

## 2018-08-10 PROCEDURE — 30233N1 TRANSFUSION OF NONAUTOLOGOUS RED BLOOD CELLS INTO PERIPHERAL VEIN, PERCUTANEOUS APPROACH: ICD-10-PCS | Performed by: ORTHOPAEDIC SURGERY

## 2018-08-10 PROCEDURE — 0SP90JZ REMOVAL OF SYNTHETIC SUBSTITUTE FROM RIGHT HIP JOINT, OPEN APPROACH: ICD-10-PCS | Performed by: ORTHOPAEDIC SURGERY

## 2018-08-10 PROCEDURE — 36430 TRANSFUSION BLD/BLD COMPNT: CPT | Performed by: ANESTHESIOLOGY

## 2018-08-10 PROCEDURE — P9045 ALBUMIN (HUMAN), 5%, 250 ML: HCPCS | Performed by: ANESTHESIOLOGY

## 2018-08-10 PROCEDURE — 0SRR0JZ REPLACEMENT OF RIGHT HIP JOINT, FEMORAL SURFACE WITH SYNTHETIC SUBSTITUTE, OPEN APPROACH: ICD-10-PCS | Performed by: ORTHOPAEDIC SURGERY

## 2018-08-10 PROCEDURE — 99232 SBSQ HOSP IP/OBS MODERATE 35: CPT | Performed by: HOSPITALIST

## 2018-08-10 PROCEDURE — 73501 X-RAY EXAM HIP UNI 1 VIEW: CPT | Performed by: ORTHOPAEDIC SURGERY

## 2018-08-10 DEVICE — IMPLANTABLE DEVICE: Type: IMPLANTABLE DEVICE | Site: HIP | Status: FUNCTIONAL

## 2018-08-10 DEVICE — BIOLOX® DELTA, CERAMIC FEMORAL HEAD, S, Ø 36/-3.5, TAPER 12/14
Type: IMPLANTABLE DEVICE | Site: HIP | Status: FUNCTIONAL
Brand: BIOLOX® DELTA

## 2018-08-10 DEVICE — WAGNER SL REVISION® HIP STEM, UNCEMENTED, Ø 19/265, TAPER 12/14
Type: IMPLANTABLE DEVICE | Site: HIP | Status: FUNCTIONAL
Brand: WAGNER SL REVISION®

## 2018-08-10 RX ORDER — ACETAMINOPHEN 325 MG/1
650 TABLET ORAL EVERY 4 HOURS PRN
Status: DISCONTINUED | OUTPATIENT
Start: 2018-08-10 | End: 2018-08-15

## 2018-08-10 RX ORDER — EPHEDRINE SULFATE 50 MG/ML
INJECTION, SOLUTION INTRAVENOUS AS NEEDED
Status: DISCONTINUED | OUTPATIENT
Start: 2018-08-10 | End: 2018-08-10 | Stop reason: SURG

## 2018-08-10 RX ORDER — SODIUM CHLORIDE 0.9 % (FLUSH) 0.9 %
10 SYRINGE (ML) INJECTION AS NEEDED
Status: DISCONTINUED | OUTPATIENT
Start: 2018-08-10 | End: 2018-08-15

## 2018-08-10 RX ORDER — ROCURONIUM BROMIDE 10 MG/ML
INJECTION, SOLUTION INTRAVENOUS AS NEEDED
Status: DISCONTINUED | OUTPATIENT
Start: 2018-08-10 | End: 2018-08-10 | Stop reason: SURG

## 2018-08-10 RX ORDER — OXYCODONE HCL 10 MG/1
10 TABLET, FILM COATED, EXTENDED RELEASE ORAL ONCE
Status: COMPLETED | OUTPATIENT
Start: 2018-08-10 | End: 2018-08-10

## 2018-08-10 RX ORDER — HALOPERIDOL 5 MG/ML
0.25 INJECTION INTRAMUSCULAR ONCE AS NEEDED
Status: DISCONTINUED | OUTPATIENT
Start: 2018-08-10 | End: 2018-08-10 | Stop reason: HOSPADM

## 2018-08-10 RX ORDER — SODIUM CHLORIDE, SODIUM LACTATE, POTASSIUM CHLORIDE, CALCIUM CHLORIDE 600; 310; 30; 20 MG/100ML; MG/100ML; MG/100ML; MG/100ML
INJECTION, SOLUTION INTRAVENOUS CONTINUOUS
Status: DISCONTINUED | OUTPATIENT
Start: 2018-08-10 | End: 2018-08-10 | Stop reason: ALTCHOICE

## 2018-08-10 RX ORDER — ONDANSETRON 2 MG/ML
INJECTION INTRAMUSCULAR; INTRAVENOUS AS NEEDED
Status: DISCONTINUED | OUTPATIENT
Start: 2018-08-10 | End: 2018-08-10 | Stop reason: SURG

## 2018-08-10 RX ORDER — HYDROCODONE BITARTRATE AND ACETAMINOPHEN 10; 325 MG/1; MG/1
2 TABLET ORAL EVERY 4 HOURS PRN
Status: DISCONTINUED | OUTPATIENT
Start: 2018-08-10 | End: 2018-08-15

## 2018-08-10 RX ORDER — METOPROLOL SUCCINATE 25 MG/1
25 TABLET, EXTENDED RELEASE ORAL DAILY
Status: DISCONTINUED | OUTPATIENT
Start: 2018-08-11 | End: 2018-08-15

## 2018-08-10 RX ORDER — DIPHENHYDRAMINE HYDROCHLORIDE 50 MG/ML
25 INJECTION INTRAMUSCULAR; INTRAVENOUS ONCE AS NEEDED
Status: ACTIVE | OUTPATIENT
Start: 2018-08-10 | End: 2018-08-10

## 2018-08-10 RX ORDER — ONDANSETRON 2 MG/ML
4 INJECTION INTRAMUSCULAR; INTRAVENOUS ONCE AS NEEDED
Status: ACTIVE | OUTPATIENT
Start: 2018-08-10 | End: 2018-08-10

## 2018-08-10 RX ORDER — ALBUMIN, HUMAN INJ 5% 5 %
250 SOLUTION INTRAVENOUS ONCE
Status: DISCONTINUED | OUTPATIENT
Start: 2018-08-10 | End: 2018-08-10

## 2018-08-10 RX ORDER — ZOLPIDEM TARTRATE 5 MG/1
5 TABLET ORAL NIGHTLY PRN
Status: DISCONTINUED | OUTPATIENT
Start: 2018-08-10 | End: 2018-08-15

## 2018-08-10 RX ORDER — LIDOCAINE HYDROCHLORIDE 10 MG/ML
INJECTION, SOLUTION EPIDURAL; INFILTRATION; INTRACAUDAL; PERINEURAL AS NEEDED
Status: DISCONTINUED | OUTPATIENT
Start: 2018-08-10 | End: 2018-08-10 | Stop reason: SURG

## 2018-08-10 RX ORDER — MORPHINE SULFATE 4 MG/ML
2 INJECTION, SOLUTION INTRAMUSCULAR; INTRAVENOUS EVERY 10 MIN PRN
Status: DISCONTINUED | OUTPATIENT
Start: 2018-08-10 | End: 2018-08-10 | Stop reason: HOSPADM

## 2018-08-10 RX ORDER — DIPHENHYDRAMINE HCL 25 MG
25 CAPSULE ORAL EVERY 4 HOURS PRN
Status: ACTIVE | OUTPATIENT
Start: 2018-08-10 | End: 2018-08-11

## 2018-08-10 RX ORDER — GLYCOPYRROLATE 0.2 MG/ML
INJECTION INTRAMUSCULAR; INTRAVENOUS AS NEEDED
Status: DISCONTINUED | OUTPATIENT
Start: 2018-08-10 | End: 2018-08-10 | Stop reason: SURG

## 2018-08-10 RX ORDER — DOCUSATE SODIUM 100 MG/1
100 CAPSULE, LIQUID FILLED ORAL 2 TIMES DAILY
Status: DISCONTINUED | OUTPATIENT
Start: 2018-08-10 | End: 2018-08-15

## 2018-08-10 RX ORDER — PHENYLEPHRINE HCL 10 MG/ML
VIAL (ML) INJECTION AS NEEDED
Status: DISCONTINUED | OUTPATIENT
Start: 2018-08-10 | End: 2018-08-10 | Stop reason: SURG

## 2018-08-10 RX ORDER — CLINDAMYCIN PHOSPHATE 900 MG/50ML
900 INJECTION INTRAVENOUS ONCE
Status: COMPLETED | OUTPATIENT
Start: 2018-08-10 | End: 2018-08-10

## 2018-08-10 RX ORDER — ONDANSETRON 2 MG/ML
4 INJECTION INTRAMUSCULAR; INTRAVENOUS ONCE AS NEEDED
Status: DISCONTINUED | OUTPATIENT
Start: 2018-08-10 | End: 2018-08-10 | Stop reason: HOSPADM

## 2018-08-10 RX ORDER — HYDROCODONE BITARTRATE AND ACETAMINOPHEN 5; 325 MG/1; MG/1
1 TABLET ORAL AS NEEDED
Status: DISCONTINUED | OUTPATIENT
Start: 2018-08-10 | End: 2018-08-10 | Stop reason: HOSPADM

## 2018-08-10 RX ORDER — SODIUM PHOSPHATE, DIBASIC AND SODIUM PHOSPHATE, MONOBASIC 7; 19 G/133ML; G/133ML
1 ENEMA RECTAL ONCE AS NEEDED
Status: DISCONTINUED | OUTPATIENT
Start: 2018-08-10 | End: 2018-08-15

## 2018-08-10 RX ORDER — FAMOTIDINE 20 MG/1
20 TABLET ORAL ONCE
Status: DISCONTINUED | OUTPATIENT
Start: 2018-08-10 | End: 2018-08-10 | Stop reason: HOSPADM

## 2018-08-10 RX ORDER — POLYETHYLENE GLYCOL 3350 17 G/17G
17 POWDER, FOR SOLUTION ORAL DAILY PRN
Status: DISCONTINUED | OUTPATIENT
Start: 2018-08-10 | End: 2018-08-15

## 2018-08-10 RX ORDER — MORPHINE SULFATE 4 MG/ML
4 INJECTION, SOLUTION INTRAMUSCULAR; INTRAVENOUS EVERY 10 MIN PRN
Status: DISCONTINUED | OUTPATIENT
Start: 2018-08-10 | End: 2018-08-10 | Stop reason: HOSPADM

## 2018-08-10 RX ORDER — SCOLOPAMINE TRANSDERMAL SYSTEM 1 MG/1
1 PATCH, EXTENDED RELEASE TRANSDERMAL ONCE
Status: COMPLETED | OUTPATIENT
Start: 2018-08-10 | End: 2018-08-13

## 2018-08-10 RX ORDER — HYDROCODONE BITARTRATE AND ACETAMINOPHEN 10; 325 MG/1; MG/1
1 TABLET ORAL EVERY 4 HOURS PRN
Status: DISCONTINUED | OUTPATIENT
Start: 2018-08-10 | End: 2018-08-15

## 2018-08-10 RX ORDER — DIPHENHYDRAMINE HYDROCHLORIDE 50 MG/ML
12.5 INJECTION INTRAMUSCULAR; INTRAVENOUS EVERY 4 HOURS PRN
Status: ACTIVE | OUTPATIENT
Start: 2018-08-10 | End: 2018-08-11

## 2018-08-10 RX ORDER — SODIUM CHLORIDE, SODIUM LACTATE, POTASSIUM CHLORIDE, CALCIUM CHLORIDE 600; 310; 30; 20 MG/100ML; MG/100ML; MG/100ML; MG/100ML
INJECTION, SOLUTION INTRAVENOUS CONTINUOUS
Status: DISCONTINUED | OUTPATIENT
Start: 2018-08-10 | End: 2018-08-10 | Stop reason: HOSPADM

## 2018-08-10 RX ORDER — METOCLOPRAMIDE HYDROCHLORIDE 5 MG/ML
10 INJECTION INTRAMUSCULAR; INTRAVENOUS EVERY 6 HOURS PRN
Status: ACTIVE | OUTPATIENT
Start: 2018-08-10 | End: 2018-08-12

## 2018-08-10 RX ORDER — FLECAINIDE ACETATE 100 MG/1
100 TABLET ORAL 2 TIMES DAILY
Status: DISCONTINUED | OUTPATIENT
Start: 2018-08-10 | End: 2018-08-15

## 2018-08-10 RX ORDER — DIPHENHYDRAMINE HYDROCHLORIDE 50 MG/ML
12.5 INJECTION INTRAMUSCULAR; INTRAVENOUS EVERY 4 HOURS PRN
Status: DISCONTINUED | OUTPATIENT
Start: 2018-08-10 | End: 2018-08-15

## 2018-08-10 RX ORDER — DIPHENHYDRAMINE HCL 25 MG
25 CAPSULE ORAL EVERY 4 HOURS PRN
Status: DISCONTINUED | OUTPATIENT
Start: 2018-08-10 | End: 2018-08-15

## 2018-08-10 RX ORDER — NALBUPHINE HCL 10 MG/ML
2.5 AMPUL (ML) INJECTION EVERY 4 HOURS PRN
Status: ACTIVE | OUTPATIENT
Start: 2018-08-10 | End: 2018-08-11

## 2018-08-10 RX ORDER — ACETAMINOPHEN 325 MG/1
650 TABLET ORAL EVERY 6 HOURS PRN
Status: ACTIVE | OUTPATIENT
Start: 2018-08-10 | End: 2018-08-11

## 2018-08-10 RX ORDER — MELATONIN
325
Status: DISCONTINUED | OUTPATIENT
Start: 2018-08-11 | End: 2018-08-15

## 2018-08-10 RX ORDER — HALOPERIDOL 5 MG/ML
0.5 INJECTION INTRAMUSCULAR ONCE AS NEEDED
Status: ACTIVE | OUTPATIENT
Start: 2018-08-10 | End: 2018-08-10

## 2018-08-10 RX ORDER — ACETAMINOPHEN 500 MG
1000 TABLET ORAL ONCE
Status: COMPLETED | OUTPATIENT
Start: 2018-08-10 | End: 2018-08-10

## 2018-08-10 RX ORDER — CELECOXIB 200 MG/1
200 CAPSULE ORAL ONCE
Status: COMPLETED | OUTPATIENT
Start: 2018-08-10 | End: 2018-08-10

## 2018-08-10 RX ORDER — NALOXONE HYDROCHLORIDE 0.4 MG/ML
0.08 INJECTION, SOLUTION INTRAMUSCULAR; INTRAVENOUS; SUBCUTANEOUS
Status: ACTIVE | OUTPATIENT
Start: 2018-08-10 | End: 2018-08-11

## 2018-08-10 RX ORDER — HYDROCODONE BITARTRATE AND ACETAMINOPHEN 7.5; 325 MG/1; MG/1
2 TABLET ORAL EVERY 6 HOURS PRN
Status: ACTIVE | OUTPATIENT
Start: 2018-08-10 | End: 2018-08-11

## 2018-08-10 RX ORDER — MORPHINE SULFATE 10 MG/ML
6 INJECTION, SOLUTION INTRAMUSCULAR; INTRAVENOUS EVERY 10 MIN PRN
Status: DISCONTINUED | OUTPATIENT
Start: 2018-08-10 | End: 2018-08-10 | Stop reason: HOSPADM

## 2018-08-10 RX ORDER — BUPIVACAINE HYDROCHLORIDE 7.5 MG/ML
INJECTION, SOLUTION EPIDURAL; RETROBULBAR AS NEEDED
Status: DISCONTINUED | OUTPATIENT
Start: 2018-08-10 | End: 2018-08-10 | Stop reason: SURG

## 2018-08-10 RX ORDER — METOPROLOL TARTRATE 5 MG/5ML
2.5 INJECTION INTRAVENOUS ONCE
Status: DISCONTINUED | OUTPATIENT
Start: 2018-08-10 | End: 2018-08-10 | Stop reason: HOSPADM

## 2018-08-10 RX ORDER — MORPHINE SULFATE 1 MG/ML
INJECTION, SOLUTION EPIDURAL; INTRATHECAL; INTRAVENOUS AS NEEDED
Status: DISCONTINUED | OUTPATIENT
Start: 2018-08-10 | End: 2018-08-10 | Stop reason: SURG

## 2018-08-10 RX ORDER — SODIUM CHLORIDE, SODIUM LACTATE, POTASSIUM CHLORIDE, CALCIUM CHLORIDE 600; 310; 30; 20 MG/100ML; MG/100ML; MG/100ML; MG/100ML
INJECTION, SOLUTION INTRAVENOUS CONTINUOUS
Status: DISCONTINUED | OUTPATIENT
Start: 2018-08-10 | End: 2018-08-15

## 2018-08-10 RX ORDER — SENNOSIDES 8.6 MG
17.2 TABLET ORAL NIGHTLY
Status: DISCONTINUED | OUTPATIENT
Start: 2018-08-10 | End: 2018-08-15

## 2018-08-10 RX ORDER — METOCLOPRAMIDE 10 MG/1
10 TABLET ORAL ONCE
Status: DISCONTINUED | OUTPATIENT
Start: 2018-08-10 | End: 2018-08-10 | Stop reason: HOSPADM

## 2018-08-10 RX ORDER — ALBUMIN, HUMAN INJ 5% 5 %
500 SOLUTION INTRAVENOUS ONCE
Status: COMPLETED | OUTPATIENT
Start: 2018-08-10 | End: 2018-08-10

## 2018-08-10 RX ORDER — HYDROCODONE BITARTRATE AND ACETAMINOPHEN 5; 325 MG/1; MG/1
2 TABLET ORAL AS NEEDED
Status: DISCONTINUED | OUTPATIENT
Start: 2018-08-10 | End: 2018-08-10 | Stop reason: HOSPADM

## 2018-08-10 RX ORDER — PANTOPRAZOLE SODIUM 40 MG/1
40 TABLET, DELAYED RELEASE ORAL
Status: DISCONTINUED | OUTPATIENT
Start: 2018-08-11 | End: 2018-08-11

## 2018-08-10 RX ORDER — BISACODYL 10 MG
10 SUPPOSITORY, RECTAL RECTAL
Status: DISCONTINUED | OUTPATIENT
Start: 2018-08-10 | End: 2018-08-15

## 2018-08-10 RX ORDER — HYDROMORPHONE HYDROCHLORIDE 2 MG/1
2 TABLET ORAL
Status: DISCONTINUED | OUTPATIENT
Start: 2018-08-10 | End: 2018-08-15

## 2018-08-10 RX ORDER — ONDANSETRON 2 MG/ML
4 INJECTION INTRAMUSCULAR; INTRAVENOUS ONCE
Status: COMPLETED | OUTPATIENT
Start: 2018-08-10 | End: 2018-08-10

## 2018-08-10 RX ORDER — OXYCODONE HCL 10 MG/1
10 TABLET, FILM COATED, EXTENDED RELEASE ORAL EVERY 12 HOURS
Status: DISCONTINUED | OUTPATIENT
Start: 2018-08-10 | End: 2018-08-13

## 2018-08-10 RX ORDER — ATORVASTATIN CALCIUM 40 MG/1
40 TABLET, FILM COATED ORAL NIGHTLY
Status: DISCONTINUED | OUTPATIENT
Start: 2018-08-10 | End: 2018-08-15

## 2018-08-10 RX ORDER — NALOXONE HYDROCHLORIDE 0.4 MG/ML
80 INJECTION, SOLUTION INTRAMUSCULAR; INTRAVENOUS; SUBCUTANEOUS AS NEEDED
Status: DISCONTINUED | OUTPATIENT
Start: 2018-08-10 | End: 2018-08-10 | Stop reason: HOSPADM

## 2018-08-10 RX ORDER — HYDROCODONE BITARTRATE AND ACETAMINOPHEN 7.5; 325 MG/1; MG/1
1 TABLET ORAL EVERY 6 HOURS PRN
Status: DISPENSED | OUTPATIENT
Start: 2018-08-10 | End: 2018-08-11

## 2018-08-10 RX ORDER — ONDANSETRON 2 MG/ML
4 INJECTION INTRAMUSCULAR; INTRAVENOUS EVERY 4 HOURS PRN
Status: DISPENSED | OUTPATIENT
Start: 2018-08-10 | End: 2018-08-12

## 2018-08-10 RX ORDER — SODIUM CHLORIDE 9 MG/ML
INJECTION, SOLUTION INTRAVENOUS CONTINUOUS PRN
Status: DISCONTINUED | OUTPATIENT
Start: 2018-08-10 | End: 2018-08-10 | Stop reason: SURG

## 2018-08-10 RX ORDER — ALBUMIN, HUMAN INJ 5% 5 %
SOLUTION INTRAVENOUS CONTINUOUS PRN
Status: DISCONTINUED | OUTPATIENT
Start: 2018-08-10 | End: 2018-08-10 | Stop reason: SURG

## 2018-08-10 RX ORDER — DEXAMETHASONE SODIUM PHOSPHATE 4 MG/ML
VIAL (ML) INJECTION AS NEEDED
Status: DISCONTINUED | OUTPATIENT
Start: 2018-08-10 | End: 2018-08-10 | Stop reason: SURG

## 2018-08-10 RX ADMIN — ALBUMIN, HUMAN INJ 5%: 5 SOLUTION INTRAVENOUS at 13:40:00

## 2018-08-10 RX ADMIN — BUPIVACAINE HYDROCHLORIDE 1.6 ML: 7.5 INJECTION, SOLUTION EPIDURAL; RETROBULBAR at 12:00:00

## 2018-08-10 RX ADMIN — SODIUM CHLORIDE: 9 INJECTION, SOLUTION INTRAVENOUS at 13:46:00

## 2018-08-10 RX ADMIN — EPHEDRINE SULFATE 5 MG: 50 INJECTION, SOLUTION INTRAVENOUS at 12:20:00

## 2018-08-10 RX ADMIN — ROCURONIUM BROMIDE 5 MG: 10 INJECTION, SOLUTION INTRAVENOUS at 12:00:00

## 2018-08-10 RX ADMIN — EPHEDRINE SULFATE 5 MG: 50 INJECTION, SOLUTION INTRAVENOUS at 14:01:00

## 2018-08-10 RX ADMIN — DEXAMETHASONE SODIUM PHOSPHATE 4 MG: 4 MG/ML VIAL (ML) INJECTION at 12:00:00

## 2018-08-10 RX ADMIN — MORPHINE SULFATE 0.3 MG: 1 INJECTION, SOLUTION EPIDURAL; INTRATHECAL; INTRAVENOUS at 12:00:00

## 2018-08-10 RX ADMIN — LIDOCAINE HYDROCHLORIDE 25 MG: 10 INJECTION, SOLUTION EPIDURAL; INFILTRATION; INTRACAUDAL; PERINEURAL at 12:00:00

## 2018-08-10 RX ADMIN — EPHEDRINE SULFATE 5 MG: 50 INJECTION, SOLUTION INTRAVENOUS at 13:05:00

## 2018-08-10 RX ADMIN — ONDANSETRON 4 MG: 2 INJECTION INTRAMUSCULAR; INTRAVENOUS at 12:00:00

## 2018-08-10 RX ADMIN — SODIUM CHLORIDE: 9 INJECTION, SOLUTION INTRAVENOUS at 14:14:00

## 2018-08-10 RX ADMIN — GLYCOPYRROLATE 0.2 MG: 0.2 INJECTION INTRAMUSCULAR; INTRAVENOUS at 12:00:00

## 2018-08-10 RX ADMIN — SODIUM CHLORIDE: 9 INJECTION, SOLUTION INTRAVENOUS at 15:05:00

## 2018-08-10 RX ADMIN — EPHEDRINE SULFATE 5 MG: 50 INJECTION, SOLUTION INTRAVENOUS at 13:40:00

## 2018-08-10 RX ADMIN — EPHEDRINE SULFATE 5 MG: 50 INJECTION, SOLUTION INTRAVENOUS at 12:15:00

## 2018-08-10 RX ADMIN — SODIUM CHLORIDE, SODIUM LACTATE, POTASSIUM CHLORIDE, CALCIUM CHLORIDE: 600; 310; 30; 20 INJECTION, SOLUTION INTRAVENOUS at 11:40:00

## 2018-08-10 RX ADMIN — SODIUM CHLORIDE, SODIUM LACTATE, POTASSIUM CHLORIDE, CALCIUM CHLORIDE: 600; 310; 30; 20 INJECTION, SOLUTION INTRAVENOUS at 12:55:00

## 2018-08-10 RX ADMIN — PHENYLEPHRINE HCL 10 MCG: 10 MG/ML VIAL (ML) INJECTION at 14:02:00

## 2018-08-10 RX ADMIN — SODIUM CHLORIDE: 9 INJECTION, SOLUTION INTRAVENOUS at 13:41:00

## 2018-08-10 RX ADMIN — ALBUMIN, HUMAN INJ 5%: 5 SOLUTION INTRAVENOUS at 13:54:00

## 2018-08-10 RX ADMIN — CLINDAMYCIN PHOSPHATE 900 MG: 900 INJECTION INTRAVENOUS at 12:01:00

## 2018-08-10 NOTE — H&P
History & Physical Examination    Patient Name: Bethany Fernandez  MRN: [id number]  [facility]: [id number]  YOB: 1965    Diagnosis: Painful R CAMILA    Present Illness: Bethany Fernandez is a 52year old yo male with a history of R hip end stage DJD s/p CAMILA.   It Pollen Extract    Past Medical History:   Diagnosis Date   • Arrhythmia     Afib   • Asthma    • Back problem    • BPH (benign prostatic hyperplasia)    • Esophageal reflux    • High cholesterol    • History of cystoscopy    • Mononucleosis    • Osteoarthr

## 2018-08-10 NOTE — PROGRESS NOTES
[facility]    Progress Note    Maria Luz Fernandez Patient Status:  Surgery Admit    1965 MRN [id number]   Location [place] [address] Attending Ruth Snowden MD   Hosp Day # 0 PCP Unknown Pcp        Subjective:     Cons MEDS, CURRENT SR. Asthma  CONT HOME MEDS. , RCIEVED 2 UNITS OF PRBC IN OR.          Results:     Lab Results  Component Value Date   WBC 8.4 04/07/2018   HGB 9.4 (L) 04/07/2018   HCT 28.2 (L) 04/07/2018    04/07/2018   CREATSERUM 1.1

## 2018-08-10 NOTE — OPERATIVE REPORT
[facility]    Revision CAMILA Operative Note    Leane Hasten Less Patient Status:  Surgery Admit    1965 MRN [id number]   [place] [address] Attending Justo Garcia MD     PCP Unknown Pcp       Preop DX: Failed rig

## 2018-08-10 NOTE — OPERATIVE REPORT
[facility]    Revision CAMILA Operative Note    Yoseph Fernandez Patient Status:  Surgery Admit    1965 MRN [id number]   Location [place] [address] Attending Polina Núñez MD     PCP Unknown Pcp       Preop DX: Failed rig surgery today.     The risks discussed include but are not limited to: infection, nerve injury (peroneal palsy and superficial numbness specifically), vessel damage, VTE, need for re-operation, dislocation, limb length discrepancy, loss of limb and loss of approach in an attempt to follow the old arthrotomy. Once entering the hip joint we performed a synovectomy and sent this tissue with a portion of the hip capsule to pathology for a frozen section.   The hip was then dislocated with flexion, adduction and followed by irrigation with pulsatile lavage. The wound was nice and clean at this point time and we opened up our size 19 x 265 mm Ness as our vision stem and placed the final implant in approximately 15° of anteversion.   Once the stem was fully seated was aroused in the operating room and taken to the recovery room in stable condition. Of note all needle and sponge counts were correct in the operating room. I was present and scrubbed for the entire procedure.     PLAN:    Postoperatively the patient wi

## 2018-08-10 NOTE — ANESTHESIA PROCEDURE NOTES
Airway  Date/Time: 8/10/2018 12:03 PM  Urgency: elective    Airway not difficult    General Information and Staff    Patient location during procedure: OR  Anesthesiologist: Eliud Hall  Performed: anesthesiologist     Indications and Patient Condi

## 2018-08-10 NOTE — ANESTHESIA PREPROCEDURE EVALUATION
Anesthesia PreOp Note    HPI:     Mehrdad Fernandez is a 52year old male who presents for preoperative consultation requested by: Sarah Kim MD    Date of Surgery: 8/10/2018    Procedure(s):  HIP TOTAL ARTHROPLASTY REVISION  Indication: loose right hip at 0700   Metoprolol Succinate ER 25 MG Oral Tablet 24 Hr Take 25 mg by mouth daily.  Disp:  Rfl:  8/9/2018 at 111 [facility] 250-50 MCG/DOSE Inhalation Aerosol Powder, Breath Activated  Disp:  Rfl:  8/10/2018 at 070   Atorvastatin Calcium (LIPITOR) 4 index is 25.25 kg/m². height is 1.778 m (5' 10\") and weight is 79.8 kg (176 lb). His oral temperature is 98.7 °F (37.1 °C). His pulse is 58. His respiration is 17 and oxygen saturation is 100%.     07/25/18  1746 08/10/18  0950   Pulse:  58   Resp:  17

## 2018-08-10 NOTE — ANESTHESIA POSTPROCEDURE EVALUATION
Patient: Leonard Fernandez    Procedure Summary     Date:  08/10/18 Room / Location:  [facility] OR [address] [place] [address] OR    Anesthesia Start:  5087 Anesthesia Stop:      Procedure:  HIP TOTAL ARTHROPLASTY REVISION (Right ) Diagnosis:  (loose right hip)    Surgeon:  L

## 2018-08-10 NOTE — ANESTHESIA PROCEDURE NOTES
Spinal Block  Performed by: Alma Painting by: Bettie Snow     Patient Location:  OR  Start Time:  8/10/2018 11:50 AM  End Time:  8/10/2018 11:59 AM  Site identification: surface landmarks    Anesthesiologist:  Bettie Snow

## 2018-08-11 LAB
BLOOD TYPE BARCODE: 5100
ERYTHROCYTE [DISTWIDTH] IN BLOOD BY AUTOMATED COUNT: 18 % (ref 11–15)
HCT VFR BLD AUTO: 24.9 % (ref 41–52)
HGB BLD-MCNC: 8.4 G/DL (ref 13.5–17.5)
MCH RBC QN AUTO: 28.1 PG (ref 27–32)
MCHC RBC AUTO-ENTMCNC: 33.5 G/DL (ref 32–37)
MCV RBC AUTO: 83.9 FL (ref 80–100)
PLATELET # BLD AUTO: 181 K/UL (ref 140–400)
PMV BLD AUTO: 8.6 FL (ref 7.4–10.3)
RBC # BLD AUTO: 2.97 M/UL (ref 4.5–5.9)
WBC # BLD AUTO: 10.3 K/UL (ref 4–11)

## 2018-08-11 PROCEDURE — 99233 SBSQ HOSP IP/OBS HIGH 50: CPT | Performed by: HOSPITALIST

## 2018-08-11 RX ORDER — FAMOTIDINE 20 MG/1
40 TABLET ORAL 2 TIMES DAILY
Status: DISCONTINUED | OUTPATIENT
Start: 2018-08-11 | End: 2018-08-15

## 2018-08-11 NOTE — PHYSICAL THERAPY NOTE
PT PM note: Pt education with therex for LE's in bed with quad sets, glut sets, ankle pumps x 10 each.  Pt reports he walked to bathroom and back with OT in PM and experienced dizziness in the chair and just got back to bed from a 2nd trip to the bathroom w

## 2018-08-11 NOTE — PROGRESS NOTES
[facility]    Progress Note    Yuridia Fernandez Patient Status:  Inpatient    1965 MRN [id number]   Location [facility] 4W/SW/SE Attending Laura Ochoa MD   Hosp Day # 1 PCP Unknown Pcp       Subjective:   Yuridianataly Fernandez is a(n

## 2018-08-11 NOTE — PLAN OF CARE
DISCHARGE PLANNING    • Discharge to home or other facility with appropriate resources Progressing        GENITOURINARY - ADULT    • Absence of urinary retention Progressing        HEMATOLOGIC - ADULT    • Maintains hematologic stability Progressing    • F POSITION, USES CALL LIGHT APPROPRIATELY. INCISION TO RIGHT HIP COVERED WITH MEDIPORE DRESSING, C/D/I. NO OTHER SKIN ISSUES NOTED. SBP <100, C/O DIZZINESS BUT IMPROVING, ON RA. TELE #53 IN PLACE. IV FLUIDS RUNNING TO LEFT HAND. TRAPEZE SET UP ON BED.

## 2018-08-11 NOTE — CM/SW NOTE
SW received an MDO regarding discharge planning s/p joint. Pt lives in ranch house with 2 stairs to enter with his wife who will be available to assist pt upon discharge. Pt is independent with ADLs, ambulates without assistance and drives.  Pt has a walker

## 2018-08-11 NOTE — OCCUPATIONAL THERAPY NOTE
OCCUPATIONAL THERAPY EVALUATION - INPATIENT      Room Number: 431/431-A  Evaluation Date: 8/11/2018  Type of Evaluation: Initial  Presenting Problem:  (R CAMILA revision due to aspetic loosening of prosthetic hip ); RLE PWB 30%    Physician Order: IP Consult OCCUPATIONAL THERAPY MEDICAL/SOCIAL HISTORY     Problem List   Principal Problem:    Aseptic loosening of prosthetic hip ([address]  Active Problems:    Essential hypertension    Paroxysmal A-fib (HCC)    Asthma      Past Medical History  Past Medical Histo (R hip area)  Management Techniques: Repositioning;Relaxation (Pt reports he had received pain medication earlier)    ACTIVITY TOLERANCE  %; 87HR; BP seated c/o dizziness 105/56-RN aware    COGNITION  Overall Cognitive Status:  WFL - within functiona session/findings; All patient questions and concerns addressed (Abductor wedge in place)    OT Goals    Patient self-stated goal is: Be able to urinate, recover from surgery     Patient will complete LE dressing with modified independent level with AE prn a

## 2018-08-11 NOTE — PHYSICAL THERAPY NOTE
PHYSICAL THERAPY HIP EVALUATION - INPATIENT     Room Number: 431/431-A  Evaluation Date: 8/11/2018  Type of Evaluation: Initial  Physician Order: PT Eval and Treat    Presenting Problem: THR RLE  (post prec. )  Reason for Therapy: Mobility Dysfunction and • Arrhythmia     Afib   • Asthma    • Back problem    • BPH (benign prostatic hyperplasia)    • Esophageal reflux    • High cholesterol    • History of cystoscopy    • Mononucleosis    • Osteoarthritis    • Torsion of testicle 1979       Past Surgical Hi functional limits RLE 3-/5, LLE 5/5    BALANCE  Static Sitting: Good  Dynamic Sitting: Fair +  Static Standing: Fair  Dynamic Standing: Fair -      ACTIVITY TOLERANCE  O2 Saturation: 52%    AM-PAC '6-Clicks' INPATIENT SHORT FORM - BASIC MOBILITY  How much Goal #1   Current Status    Goal #2 Patient is able to demonstrate transfers Sit to/from Stand at assistance level: modified independent     Goal #2  Current Status    Goal #3 Patient is able to ambulate 300 feet with assistive device at assistance level

## 2018-08-11 NOTE — ANESTHESIA POST-OP FOLLOW-UP NOTE
S/P R CAMILA revision. Adequate postop pain control with Duramorph SAB. No more motor block, back intact, better now with the skin itch. Was under GETA during surgery as well., 2 units of PRBC transfusion for an EBL of 800. VS- stable.

## 2018-08-12 ENCOUNTER — PRIOR ORIGINAL RECORDS (OUTPATIENT)
Dept: OTHER | Age: 53
End: 2018-08-12

## 2018-08-12 LAB
ERYTHROCYTE [DISTWIDTH] IN BLOOD BY AUTOMATED COUNT: 18 % (ref 11–15)
HCT VFR BLD AUTO: 20.9 % (ref 41–52)
HGB BLD-MCNC: 7.2 G/DL (ref 13.5–17.5)
HGB BLD-MCNC: 7.2 G/DL (ref 13.5–17.5)
MCH RBC QN AUTO: 29.1 PG (ref 27–32)
MCHC RBC AUTO-ENTMCNC: 34.5 G/DL (ref 32–37)
MCV RBC AUTO: 84.2 FL (ref 80–100)
PLATELET # BLD AUTO: 166 K/UL (ref 140–400)
PMV BLD AUTO: 8.3 FL (ref 7.4–10.3)
RBC # BLD AUTO: 2.49 M/UL (ref 4.5–5.9)
WBC # BLD AUTO: 10.3 K/UL (ref 4–11)

## 2018-08-12 PROCEDURE — 99233 SBSQ HOSP IP/OBS HIGH 50: CPT | Performed by: HOSPITALIST

## 2018-08-12 NOTE — PHYSICAL THERAPY NOTE
PHYSICAL THERAPY HIP TREATMENT NOTE - INPATIENT    Room Number: 431/431-A            Presenting Problem: THR RLE  (post prec. )    Problem List  Principal Problem:    Aseptic loosening of prosthetic hip (HCC)  Active Problems:    Essential hypertension abduction pillow    WEIGHT BEARING RESTRICTION  Weight Bearing Restriction: R lower extremity        R Lower Extremity: Partial Weight Bearing (30%)       PAIN ASSESSMENT   Rating:  (did not rate)  Location: R hip  Management Techniques:  Activity promotion Hamstring Curls 0 reps 0 reps   Forward, back steps 0 reps 0 reps   Short Squats 0 reps 0 reps     Patient End of Session: In bed;Needs met;Call light within reach;RN aware of session/findings; All patient questions and concerns addressed;SCDs in place; Fa

## 2018-08-12 NOTE — PLAN OF CARE
DISCHARGE PLANNING    • Discharge to home or other facility with appropriate resources Progressing        GENITOURINARY - ADULT    • Absence of urinary retention Progressing        HEMATOLOGIC - ADULT    • Maintains hematologic stability Progressing    • F C/D/I. V/S STABLE, ON RA. TELE #53  IV FLUIDS RUNNING TO LEFT HAND. HEMOVAC DRAINING SANGIOUS OUTPUT.

## 2018-08-12 NOTE — PROGRESS NOTES
[facility]    Progress Note    Mary Fernandez Patient Status:  Surgery Admit    1965 MRN [id number]   Location [facility] 45 Attending Alicia Moore MD   [facility] Day # 2 PCP Unknown Pcp        Subjective:   Yoselyn Serra (ftu=[id number])    Result Date: 8/10/2018  CONCLUSION:  1. Status post right hip replacement revision.      Dictated by (CST): Samina Fuentes MD on 8/10/2018 at 16:58     Approved by (CST): Yenny Christine MD on 8/10/2018 at 17:00

## 2018-08-13 LAB
ERYTHROCYTE [DISTWIDTH] IN BLOOD BY AUTOMATED COUNT: 17.8 % (ref 11–15)
HCT VFR BLD AUTO: 19.9 % (ref 41–52)
HGB BLD-MCNC: 6.8 G/DL (ref 13.5–17.5)
HGB BLD-MCNC: 7.8 G/DL (ref 13.5–17.5)
MCH RBC QN AUTO: 28.7 PG (ref 27–32)
MCHC RBC AUTO-ENTMCNC: 33.9 G/DL (ref 32–37)
MCV RBC AUTO: 84.7 FL (ref 80–100)
PLATELET # BLD AUTO: 181 K/UL (ref 140–400)
PMV BLD AUTO: 7.9 FL (ref 7.4–10.3)
RBC # BLD AUTO: 2.35 M/UL (ref 4.5–5.9)
WBC # BLD AUTO: 10.1 K/UL (ref 4–11)

## 2018-08-13 PROCEDURE — 99233 SBSQ HOSP IP/OBS HIGH 50: CPT | Performed by: HOSPITALIST

## 2018-08-13 RX ORDER — DIPHENHYDRAMINE HYDROCHLORIDE 50 MG/ML
25 INJECTION INTRAMUSCULAR; INTRAVENOUS ONCE
Status: COMPLETED | OUTPATIENT
Start: 2018-08-13 | End: 2018-08-13

## 2018-08-13 RX ORDER — 0.9 % SODIUM CHLORIDE 0.9 %
VIAL (ML) INJECTION
Status: COMPLETED
Start: 2018-08-13 | End: 2018-08-13

## 2018-08-13 RX ORDER — SODIUM CHLORIDE 9 MG/ML
INJECTION, SOLUTION INTRAVENOUS
Status: COMPLETED
Start: 2018-08-13 | End: 2018-08-13

## 2018-08-13 RX ORDER — SODIUM CHLORIDE 9 MG/ML
INJECTION, SOLUTION INTRAVENOUS ONCE
Status: COMPLETED | OUTPATIENT
Start: 2018-08-13 | End: 2018-08-13

## 2018-08-13 RX ORDER — SODIUM CHLORIDE 0.9 % (FLUSH) 0.9 %
10 SYRINGE (ML) INJECTION AS NEEDED
Status: DISCONTINUED | OUTPATIENT
Start: 2018-08-13 | End: 2018-08-15

## 2018-08-13 NOTE — PHYSICAL THERAPY NOTE
2nd attempt at treatment pt was approved for therapy and Hg was 7.8. Pt declined therapy session this am and asked to return this afternoon. Will follow up. RN aware.

## 2018-08-13 NOTE — PROGRESS NOTES
[facility]    Progress Note    Mehrdad Fernandez Patient Status:  Surgery Admit    1965 MRN [id number]   Location [place] [address] Attending Jesika Crouch MD   [facility] Day # 3 PCP Unknown Pcp        Subjective:   Cynthia Ibarra

## 2018-08-13 NOTE — PHYSICAL THERAPY NOTE
PHYSICAL THERAPY HIP TREATMENT NOTE - INPATIENT    Room Number: 431/431-A            Presenting Problem: THR RLE  (post prec. )    Problem List  Principal Problem:    Aseptic loosening of prosthetic hip (HCC)  Active Problems:    Essential hypertension (e.g., wheelchair, bedside commode, etc.): A Little   -   Moving from lying on back to sitting on the side of the bed?: A Little   How much help from another person does the patient currently need. ..   -   Moving to and from a bed to a chair (including a w Goal #4   Current Status NT   Goal #5 Patient verbalizes and/or demonstrates all precautions and safety concerns independently   Goal #5   Current Status IN PROGRESS   Goal #6 Patient independently performs home exercise program for ROM/strengthening per

## 2018-08-13 NOTE — PHYSICAL THERAPY NOTE
Pt is on hold this AM due to Hg is at 6.8. Pt is going to receive a transfusion. Will follow in afternoon. RN aware.

## 2018-08-13 NOTE — PLAN OF CARE
DISCHARGE PLANNING    • Discharge to home or other facility with appropriate resources Progressing        GENITOURINARY - ADULT    • Absence of urinary retention Progressing        HEMATOLOGIC - ADULT    • Maintains hematologic stability Progressing    • F OTHER SKIN ISSUES NOTED. ON RA, LOW GRADE TEMP, MONITORING BP AND Hgb. TELE #53 PRESENTING WITH NSR. IV SALINE LOCKED TO LEFT HAND.

## 2018-08-13 NOTE — PROGRESS NOTES
[facility]    Progress Note    Naty Fernandez Patient Status:  Inpatient    1965 MRN [id number]   Location [facility] 4W/SW/SE Attending Milady Spencer MD   Hosp Day # 3 PCP Unknown Pcp       Subjective:   Shreyaabdirahman Mert Jim is a(n

## 2018-08-14 ENCOUNTER — PRIOR ORIGINAL RECORDS (OUTPATIENT)
Dept: OTHER | Age: 53
End: 2018-08-14

## 2018-08-14 LAB
ANION GAP SERPL CALC-SCNC: 4 MMOL/L (ref 0–18)
BLOOD TYPE BARCODE: 5100
BUN SERPL-MCNC: 8 MG/DL (ref 8–20)
BUN/CREAT SERPL: 7.5 (ref 10–20)
CALCIUM SERPL-MCNC: 7.9 MG/DL (ref 8.5–10.5)
CHLORIDE SERPL-SCNC: 103 MMOL/L (ref 95–110)
CO2 SERPL-SCNC: 29 MMOL/L (ref 22–32)
CREAT SERPL-MCNC: 1.07 MG/DL (ref 0.5–1.5)
GLUCOSE SERPL-MCNC: 106 MG/DL (ref 70–99)
HCT VFR BLD AUTO: 25.4 % (ref 41–52)
HGB BLD-MCNC: 8.5 G/DL (ref 13.5–17.5)
OSMOLALITY UR CALC.SUM OF ELEC: 281 MOSM/KG (ref 275–295)
POTASSIUM SERPL-SCNC: 3.4 MMOL/L (ref 3.3–5.1)
SODIUM SERPL-SCNC: 136 MMOL/L (ref 136–144)

## 2018-08-14 PROCEDURE — 99233 SBSQ HOSP IP/OBS HIGH 50: CPT | Performed by: HOSPITALIST

## 2018-08-14 RX ORDER — MELATONIN
325
Qty: 30 TABLET | Refills: 0 | Status: SHIPPED | OUTPATIENT
Start: 2018-08-15 | End: 2020-08-18

## 2018-08-14 RX ORDER — ONDANSETRON 2 MG/ML
4 INJECTION INTRAMUSCULAR; INTRAVENOUS EVERY 6 HOURS PRN
Status: DISCONTINUED | OUTPATIENT
Start: 2018-08-14 | End: 2018-08-15

## 2018-08-14 NOTE — PROGRESS NOTES
[facility]    Progress Note    Leane Hasten Less Patient Status:  Surgery Admit    1965 MRN [id number]   Location [place] [address] Attending Justo Garcia MD   [facility] Day # 4 PCP Unknown Pcp        Subjective:   Gracie Rivero coordination of care and in d/w patient and family     Results:       Lab Results  Component Value Date   WBC 10.1 08/13/2018   HGB 8.5 (L) 08/14/2018   HCT 25.4 (L) 08/14/2018    08/13/2018   CREATSERUM 1.07 08/14/2018   BUN 8 08/14/2018    0

## 2018-08-14 NOTE — OCCUPATIONAL THERAPY NOTE
OCCUPATIONAL THERAPY TREATMENT NOTE - INPATIENT    Room Number: 431/431-A         Presenting Problem: R CAMILA revision     Problem List  Principal Problem:    Aseptic loosening of prosthetic hip [facility])  Active Problems:    Essential hypertension    Paroxysmal Repositioning     ACTIVITY TOLERANCE  BP 97/70 (supine)  O2 sats 99%  HR 80    /66 (sitting)    ACTIVITIES OF DAILY LIVING ASSESSMENT  AM-PAC ‘6-Clicks’ Inpatient Daily Activity Short Form  How much help from another person does the patient currently level with modified independent level. Comment: ~2 minutes standing with SBA    Patient will independently recall posterior hip precautions and adhere to them during functional activities.    Comment: Pt is independent with recalling hip precautions

## 2018-08-14 NOTE — PHYSICAL THERAPY NOTE
PHYSICAL THERAPY HIP TREATMENT NOTE - INPATIENT    Room Number: 431/431-A            Presenting Problem: THR RLE  (post prec. )    Problem List  Principal Problem:    Aseptic loosening of prosthetic hip (HCC)  Active Problems:    Essential hypertension from another person does the patient currently need. ..   -   Moving to and from a bed to a chair (including a wheelchair)?: A Little   -   Need to walk in hospital room?: A Little   -   Climbing 3-5 steps with a railing?: A Little     AM-PAC Score:  Raw Sc independently performs home exercise program for ROM/strengthening per the instructions provided in preparation for discharge.    Goal #6  Current Status IN PROGRESS

## 2018-08-15 VITALS
DIASTOLIC BLOOD PRESSURE: 54 MMHG | RESPIRATION RATE: 18 BRPM | HEIGHT: 70 IN | BODY MASS INDEX: 25.2 KG/M2 | TEMPERATURE: 99 F | OXYGEN SATURATION: 97 % | WEIGHT: 176 LBS | SYSTOLIC BLOOD PRESSURE: 92 MMHG | HEART RATE: 74 BPM

## 2018-08-15 LAB
BUN: 8 MG/DL
CALCIUM: 7.9 MG/DL
CHLORIDE: 103 MEQ/L
CREATININE, SERUM: 1.07 MG/DL
GLUCOSE: 106 MG/DL
HEMATOCRIT: 19.9 %
HEMATOCRIT: 20.9 %
HEMATOCRIT: 24.9 %
HEMOGLOBIN: 6.8 G/DL
HEMOGLOBIN: 7.2 G/DL
HEMOGLOBIN: 8.4 G/DL
PLATELETS: 166 K/UL
PLATELETS: 181 K/UL
PLATELETS: 181 K/UL
POTASSIUM, SERUM: 3.4 MEQ/L
RED BLOOD COUNT: 2.35 X 10-6/U
RED BLOOD COUNT: 2.49 X 10-6/U
RED BLOOD COUNT: 2.97 X 10-6/U
SODIUM: 136 MEQ/L
WHITE BLOOD COUNT: 10.1 X 10-3/U
WHITE BLOOD COUNT: 10.3 X 10-3/U
WHITE BLOOD COUNT: 10.3 X 10-3/U

## 2018-08-15 PROCEDURE — 99239 HOSP IP/OBS DSCHRG MGMT >30: CPT | Performed by: HOSPITALIST

## 2018-08-15 NOTE — CM/SW NOTE
The pt. Has been discharged home with no home health care. The pt. Will follow up when he is able to do PT, as his therapy per ortho has been limited.       Christine Hidalgo, [facility] ext [phone]

## 2018-08-15 NOTE — PROGRESS NOTES
[facility]    Progress Note    Breanna Fernandez Patient Status:  Inpatient    1965 MRN [id number]   Location [facility] 4W/SW/SE Attending Deborah French MD   [facility] Day # 5 PCP Unknown Pcp       Subjective:   Breanna Ch Less is a(n Santosh Toro  [phone] (c)  [phone] (o)  8/15/2018

## 2018-08-15 NOTE — DISCHARGE SUMMARY
[facility]    Discharge Summary    Broderick Fernandez Patient Status:  Inpatient    1965 MRN [id number]   Location [facility] 4W/SW/SE Attending Jaci Gaines MD   [facility] Day # 5 PCP Unknown Pcp     Date of Admission: 8/10/2018 revision R CAMILA. There are no contraindications to proceed with surgery today.   Informed consent has been obtained and all RBA have been discussed.     Hospital Course:   Aseptic loosening of prosthetic hip ([address]  - s/p rt hip revision  - xarelto for dvt p 90 tablet  Refills:  3        STOP taking these medications    aspirin 81 MG Tabs              Where to Get Your Medications      Please  your prescriptions at the location directed by your doctor or nurse    Bring a paper prescription for each of t

## 2018-11-16 ENCOUNTER — PRIOR ORIGINAL RECORDS (OUTPATIENT)
Dept: OTHER | Age: 53
End: 2018-11-16

## 2018-11-27 ENCOUNTER — PRIOR ORIGINAL RECORDS (OUTPATIENT)
Dept: OTHER | Age: 53
End: 2018-11-27

## 2018-12-12 ENCOUNTER — PRIOR ORIGINAL RECORDS (OUTPATIENT)
Dept: OTHER | Age: 53
End: 2018-12-12

## 2019-01-08 ENCOUNTER — PRIOR ORIGINAL RECORDS (OUTPATIENT)
Dept: OTHER | Age: 54
End: 2019-01-08

## 2019-01-08 ENCOUNTER — MYAURORA ACCOUNT LINK (OUTPATIENT)
Dept: OTHER | Age: 54
End: 2019-01-08

## 2019-01-15 ENCOUNTER — PRIOR ORIGINAL RECORDS (OUTPATIENT)
Dept: OTHER | Age: 54
End: 2019-01-15

## 2019-02-28 VITALS
OXYGEN SATURATION: 98 % | WEIGHT: 178 LBS | SYSTOLIC BLOOD PRESSURE: 118 MMHG | HEIGHT: 70 IN | DIASTOLIC BLOOD PRESSURE: 80 MMHG | BODY MASS INDEX: 25.48 KG/M2 | HEART RATE: 59 BPM

## 2019-02-28 VITALS
OXYGEN SATURATION: 96 % | SYSTOLIC BLOOD PRESSURE: 120 MMHG | HEART RATE: 63 BPM | WEIGHT: 177 LBS | DIASTOLIC BLOOD PRESSURE: 70 MMHG | BODY MASS INDEX: 25.34 KG/M2 | HEIGHT: 70 IN

## 2019-03-01 VITALS
HEIGHT: 71 IN | OXYGEN SATURATION: 98 % | SYSTOLIC BLOOD PRESSURE: 124 MMHG | BODY MASS INDEX: 25.2 KG/M2 | HEART RATE: 53 BPM | RESPIRATION RATE: 8 BRPM | DIASTOLIC BLOOD PRESSURE: 79 MMHG | WEIGHT: 180 LBS

## 2019-03-20 ENCOUNTER — APPOINTMENT (OUTPATIENT)
Dept: CARDIOLOGY | Age: 54
End: 2019-03-20

## 2019-03-22 RX ORDER — ATORVASTATIN CALCIUM 40 MG/1
40 TABLET, FILM COATED ORAL
COMMUNITY
Start: 2018-12-04 | End: 2019-07-12

## 2019-03-22 RX ORDER — FLECAINIDE ACETATE 150 MG/1
150 TABLET ORAL
COMMUNITY
Start: 2019-01-15 | End: 2019-07-05 | Stop reason: SDUPTHER

## 2019-03-22 RX ORDER — METOPROLOL SUCCINATE 50 MG/1
TABLET, EXTENDED RELEASE ORAL
Qty: 90 TABLET | Refills: 1 | Status: SHIPPED | OUTPATIENT
Start: 2019-03-22 | End: 2019-08-28 | Stop reason: SDUPTHER

## 2019-03-22 RX ORDER — METOPROLOL SUCCINATE 50 MG/1
50 TABLET, EXTENDED RELEASE ORAL
COMMUNITY
Start: 2019-01-15 | End: 2019-07-05 | Stop reason: SDUPTHER

## 2019-03-22 RX ORDER — ASPIRIN 81 MG/1
81 TABLET, CHEWABLE ORAL
COMMUNITY

## 2019-03-22 RX ORDER — FLUTICASONE PROPIONATE AND SALMETEROL 250; 50 UG/1; UG/1
1 POWDER RESPIRATORY (INHALATION) EVERY 12 HOURS SCHEDULED
COMMUNITY

## 2019-03-22 RX ORDER — FLECAINIDE ACETATE 150 MG/1
TABLET ORAL
Qty: 180 TABLET | Refills: 1 | Status: SHIPPED | OUTPATIENT
Start: 2019-03-22 | End: 2019-08-10 | Stop reason: SDUPTHER

## 2019-03-22 RX ORDER — PANTOPRAZOLE SODIUM 20 MG/1
20 TABLET, DELAYED RELEASE ORAL
COMMUNITY
Start: 2013-05-24

## 2019-05-28 RX ORDER — PANTOPRAZOLE SODIUM 40 MG/1
TABLET, DELAYED RELEASE ORAL
Qty: 90 TABLET | Refills: 0 | Status: SHIPPED | OUTPATIENT
Start: 2019-05-28 | End: 2019-08-24

## 2019-05-28 NOTE — TELEPHONE ENCOUNTER
Nursing: Short-term Rx sent. It has been 1 year since the patient was seen in office. Please contact him to arrange a nonurgent follow-up.

## 2019-05-28 NOTE — TELEPHONE ENCOUNTER
Requested Prescriptions     Pending Prescriptions Disp Refills   • PANTOPRAZOLE SODIUM 40 MG Oral Tab EC [Pharmacy Med Name: PANTOPRAZOLE SOD 40MG EC TABLET] 90 tablet 3     Sig: TAKE 1 TABLET BY MOUTH  EVERY MORNING BEFORE  BREAKFAST     LOV-5/23/18  LR-5

## 2019-06-27 ENCOUNTER — OFFICE VISIT (OUTPATIENT)
Dept: GASTROENTEROLOGY | Facility: CLINIC | Age: 54
End: 2019-06-27
Payer: COMMERCIAL

## 2019-06-27 ENCOUNTER — TELEPHONE (OUTPATIENT)
Dept: GASTROENTEROLOGY | Facility: CLINIC | Age: 54
End: 2019-06-27

## 2019-06-27 VITALS
HEART RATE: 56 BPM | WEIGHT: 186.63 LBS | SYSTOLIC BLOOD PRESSURE: 109 MMHG | BODY MASS INDEX: 26.72 KG/M2 | DIASTOLIC BLOOD PRESSURE: 75 MMHG | HEIGHT: 70 IN

## 2019-06-27 DIAGNOSIS — R13.19 ESOPHAGEAL DYSPHAGIA: ICD-10-CM

## 2019-06-27 DIAGNOSIS — Z12.11 COLON CANCER SCREENING: ICD-10-CM

## 2019-06-27 DIAGNOSIS — R13.10 DYSPHAGIA, UNSPECIFIED TYPE: ICD-10-CM

## 2019-06-27 DIAGNOSIS — Z12.12 SCREENING FOR COLORECTAL CANCER: ICD-10-CM

## 2019-06-27 DIAGNOSIS — Z12.11 SCREENING FOR COLORECTAL CANCER: ICD-10-CM

## 2019-06-27 DIAGNOSIS — Z86.010 HISTORY OF COLON POLYPS: ICD-10-CM

## 2019-06-27 DIAGNOSIS — K21.00 GASTROESOPHAGEAL REFLUX DISEASE WITH ESOPHAGITIS: Primary | ICD-10-CM

## 2019-06-27 DIAGNOSIS — K21.9 GASTROESOPHAGEAL REFLUX DISEASE, ESOPHAGITIS PRESENCE NOT SPECIFIED: Primary | ICD-10-CM

## 2019-06-27 NOTE — PATIENT INSTRUCTIONS
1.  Take pantoprazole 15-30 minutes before a meal.  2.  Schedule upper and lower endoscopy and possible esophageal dilatation for gastroesophageal reflux, dysphagia and a history of a colon polyp/screening. Split dose MiraLAX/Gatorade preparation.   We ac

## 2019-06-27 NOTE — PROGRESS NOTES
HPI:    Patient ID: Selam Fernandez is a 62year old male. HPI  Ben Priest returns in follow-up. He was last seen by myself in June 2015 and by ARPAN Wagner in May 2018.     As per previous notes Ben Priest underwent a screening colonoscopy in February 2014 w ADVAIR DISKUS 250-50 MCG/DOSE Inhalation Aerosol Powder, Breath Activated  Disp:  Rfl:    ferrous sulfate 325 (65 FE) MG Oral Tab EC Take 1 tablet (325 mg total) by mouth daily with breakfast. Disp: 30 tablet Rfl: 0   Atorvastatin Calcium (LIPITOR) 40 MG possible esophageal dilatation which will be arranged following monitored anesthesia care. If the patient's throat symptoms persist I would recommend ENT evaluation. 2. Esophageal dysphagia  See above    3.  History of colon polyps  Ben Priest has a history

## 2019-06-27 NOTE — TELEPHONE ENCOUNTER
Can we see if we can get the patient scheduled for his procedures on Thursday, July 18 or Thursday, July 25? If MAC is not available on those days please let me know and I will see what I can do.

## 2019-07-02 NOTE — TELEPHONE ENCOUNTER
Scheduled for:  Colonoscopy 89245 and EGD [address]  Provider Name: Dr. Juwan Lopez  Date:  7/25/19  Location:  [facility]  Sedation:  MAC  Time:   4230 (pt is aware to arrive at 1315)   Prep:  Miralax/Gatorade, sent via [employer]/Allergies Reconciled?:  Physician reviewe

## 2019-07-02 NOTE — TELEPHONE ENCOUNTER
GI/RN--    This patient is scheduled, see below    I sent a referral to [facility], please check for PA since this patient has [facility] and is scheduled at [address] [place] [address] since he has A-Fib, thank you

## 2019-07-02 NOTE — TELEPHONE ENCOUNTER
Dr. Ann Marie Reilly--    This patient is scheduled :)    You may close this TE when done, thank you

## 2019-07-03 NOTE — TELEPHONE ENCOUNTER
Called [facility] at [phone] spoke to [employer] procedure codes Colonoscopy 65480 and EGD 33878 states pt does need PA to be started for procedure.   Ref # for call-- [phone]      Started PA, gave DX codes- Colon cancer screening Z12.11  History of colon polyps

## 2019-07-05 PROBLEM — I48.91 AF (ATRIAL FIBRILLATION) (CMD): Status: ACTIVE | Noted: 2019-07-05

## 2019-07-05 PROBLEM — J45.909 ASTHMA: Status: ACTIVE | Noted: 2019-07-05

## 2019-07-05 PROBLEM — R00.2 PALPITATION: Status: ACTIVE | Noted: 2019-07-05

## 2019-07-05 PROBLEM — E78.00 PURE HYPERCHOLESTEROLEMIA: Status: ACTIVE | Noted: 2019-07-05

## 2019-07-12 ENCOUNTER — OFFICE VISIT (OUTPATIENT)
Dept: CARDIOLOGY | Age: 54
End: 2019-07-12

## 2019-07-12 VITALS
BODY MASS INDEX: 25.77 KG/M2 | HEIGHT: 70 IN | SYSTOLIC BLOOD PRESSURE: 108 MMHG | WEIGHT: 180 LBS | HEART RATE: 58 BPM | OXYGEN SATURATION: 98 % | DIASTOLIC BLOOD PRESSURE: 70 MMHG

## 2019-07-12 DIAGNOSIS — I48.0 PAROXYSMAL ATRIAL FIBRILLATION (CMD): ICD-10-CM

## 2019-07-12 DIAGNOSIS — Z79.899 HIGH RISK MEDICATION USE: Primary | ICD-10-CM

## 2019-07-12 DIAGNOSIS — E78.00 PURE HYPERCHOLESTEROLEMIA: ICD-10-CM

## 2019-07-12 PROCEDURE — 99214 OFFICE O/P EST MOD 30 MIN: CPT | Performed by: INTERNAL MEDICINE

## 2019-07-12 PROCEDURE — 93000 ELECTROCARDIOGRAM COMPLETE: CPT | Performed by: INTERNAL MEDICINE

## 2019-07-12 ASSESSMENT — PATIENT HEALTH QUESTIONNAIRE - PHQ9
1. LITTLE INTEREST OR PLEASURE IN DOING THINGS: NOT AT ALL
SUM OF ALL RESPONSES TO PHQ9 QUESTIONS 1 AND 2: 0
SUM OF ALL RESPONSES TO PHQ9 QUESTIONS 1 AND 2: 0
2. FEELING DOWN, DEPRESSED OR HOPELESS: NOT AT ALL

## 2019-07-15 NOTE — TELEPHONE ENCOUNTER
Pt Surgery/Procedure: Colonoscopy 91054 and EGD 4323  Pt insurance/number to contact: Called [facility] at [phone] spoke to [facility] ID# and group: [id number]  Dx:GERD K21.9, Dysphagia R13.10, History of colon polyps Z86.010, Colon cancer screening Z12.

## 2019-07-16 LAB
CHOLEST SERPL-MCNC: 287 MG/DL
CHOLEST/HDLC SERPL: NORMAL {RATIO}
HDLC SERPL-MCNC: 54 MG/DL
LDLC SERPL CALC-MCNC: 209 MG/DL
LENGTH OF FAST TIME PATIENT: NORMAL H
NONHDLC SERPL-MCNC: 233 MG/DL
TRIGL SERPL-MCNC: 122 MG/DL
VLDLC SERPL CALC-MCNC: NORMAL MG/DL

## 2019-07-17 ENCOUNTER — CLINICAL ABSTRACT (OUTPATIENT)
Dept: CARDIOLOGY | Age: 54
End: 2019-07-17

## 2019-07-19 ENCOUNTER — TELEPHONE (OUTPATIENT)
Dept: CARDIOLOGY | Age: 54
End: 2019-07-19

## 2019-07-19 DIAGNOSIS — E78.00 HYPERCHOLESTEREMIA: Primary | ICD-10-CM

## 2019-07-25 ENCOUNTER — HOSPITAL ENCOUNTER (OUTPATIENT)
Facility: HOSPITAL | Age: 54
Setting detail: HOSPITAL OUTPATIENT SURGERY
Discharge: HOME | End: 2019-07-25
Attending: INTERNAL MEDICINE | Admitting: INTERNAL MEDICINE
Payer: COMMERCIAL

## 2019-07-25 ENCOUNTER — ANESTHESIA EVENT (OUTPATIENT)
Dept: ENDOSCOPY | Facility: HOSPITAL | Age: 54
End: 2019-07-25
Payer: COMMERCIAL

## 2019-07-25 ENCOUNTER — ANESTHESIA (OUTPATIENT)
Dept: ENDOSCOPY | Facility: HOSPITAL | Age: 54
End: 2019-07-25
Payer: COMMERCIAL

## 2019-07-25 DIAGNOSIS — Z12.11 COLON CANCER SCREENING: ICD-10-CM

## 2019-07-25 DIAGNOSIS — K21.9 GASTROESOPHAGEAL REFLUX DISEASE, ESOPHAGITIS PRESENCE NOT SPECIFIED: ICD-10-CM

## 2019-07-25 DIAGNOSIS — Z86.010 HISTORY OF COLON POLYPS: ICD-10-CM

## 2019-07-25 DIAGNOSIS — R13.10 DYSPHAGIA, UNSPECIFIED TYPE: ICD-10-CM

## 2019-07-25 PROCEDURE — 0DBN8ZX EXCISION OF SIGMOID COLON, VIA NATURAL OR ARTIFICIAL OPENING ENDOSCOPIC, DIAGNOSTIC: ICD-10-PCS | Performed by: INTERNAL MEDICINE

## 2019-07-25 PROCEDURE — 43239 EGD BIOPSY SINGLE/MULTIPLE: CPT | Performed by: INTERNAL MEDICINE

## 2019-07-25 PROCEDURE — 0DBL8ZX EXCISION OF TRANSVERSE COLON, VIA NATURAL OR ARTIFICIAL OPENING ENDOSCOPIC, DIAGNOSTIC: ICD-10-PCS | Performed by: INTERNAL MEDICINE

## 2019-07-25 PROCEDURE — 0DB78ZX EXCISION OF STOMACH, PYLORUS, VIA NATURAL OR ARTIFICIAL OPENING ENDOSCOPIC, DIAGNOSTIC: ICD-10-PCS | Performed by: INTERNAL MEDICINE

## 2019-07-25 PROCEDURE — 43249 ESOPH EGD DILATION <30 MM: CPT | Performed by: INTERNAL MEDICINE

## 2019-07-25 PROCEDURE — 0DBK8ZX EXCISION OF ASCENDING COLON, VIA NATURAL OR ARTIFICIAL OPENING ENDOSCOPIC, DIAGNOSTIC: ICD-10-PCS | Performed by: INTERNAL MEDICINE

## 2019-07-25 PROCEDURE — 0DB48ZX EXCISION OF ESOPHAGOGASTRIC JUNCTION, VIA NATURAL OR ARTIFICIAL OPENING ENDOSCOPIC, DIAGNOSTIC: ICD-10-PCS | Performed by: INTERNAL MEDICINE

## 2019-07-25 PROCEDURE — 0DBH8ZX EXCISION OF CECUM, VIA NATURAL OR ARTIFICIAL OPENING ENDOSCOPIC, DIAGNOSTIC: ICD-10-PCS | Performed by: INTERNAL MEDICINE

## 2019-07-25 PROCEDURE — 0DBM8ZX EXCISION OF DESCENDING COLON, VIA NATURAL OR ARTIFICIAL OPENING ENDOSCOPIC, DIAGNOSTIC: ICD-10-PCS | Performed by: INTERNAL MEDICINE

## 2019-07-25 PROCEDURE — 0D748ZZ DILATION OF ESOPHAGOGASTRIC JUNCTION, VIA NATURAL OR ARTIFICIAL OPENING ENDOSCOPIC: ICD-10-PCS | Performed by: INTERNAL MEDICINE

## 2019-07-25 PROCEDURE — 45385 COLONOSCOPY W/LESION REMOVAL: CPT | Performed by: INTERNAL MEDICINE

## 2019-07-25 RX ORDER — SODIUM CHLORIDE, SODIUM LACTATE, POTASSIUM CHLORIDE, CALCIUM CHLORIDE 600; 310; 30; 20 MG/100ML; MG/100ML; MG/100ML; MG/100ML
INJECTION, SOLUTION INTRAVENOUS CONTINUOUS
Status: DISCONTINUED | OUTPATIENT
Start: 2019-07-25 | End: 2019-07-25

## 2019-07-25 RX ORDER — NALOXONE HYDROCHLORIDE 0.4 MG/ML
80 INJECTION, SOLUTION INTRAMUSCULAR; INTRAVENOUS; SUBCUTANEOUS AS NEEDED
Status: CANCELLED | OUTPATIENT
Start: 2019-07-25 | End: 2019-07-25

## 2019-07-25 RX ORDER — SODIUM CHLORIDE, SODIUM LACTATE, POTASSIUM CHLORIDE, CALCIUM CHLORIDE 600; 310; 30; 20 MG/100ML; MG/100ML; MG/100ML; MG/100ML
INJECTION, SOLUTION INTRAVENOUS CONTINUOUS
Status: CANCELLED | OUTPATIENT
Start: 2019-07-25

## 2019-07-25 RX ORDER — LIDOCAINE HYDROCHLORIDE 10 MG/ML
INJECTION, SOLUTION EPIDURAL; INFILTRATION; INTRACAUDAL; PERINEURAL AS NEEDED
Status: DISCONTINUED | OUTPATIENT
Start: 2019-07-25 | End: 2019-07-25 | Stop reason: SURG

## 2019-07-25 RX ORDER — ONDANSETRON 2 MG/ML
4 INJECTION INTRAMUSCULAR; INTRAVENOUS ONCE AS NEEDED
Status: CANCELLED | OUTPATIENT
Start: 2019-07-25 | End: 2019-07-25

## 2019-07-25 RX ADMIN — SODIUM CHLORIDE, SODIUM LACTATE, POTASSIUM CHLORIDE, CALCIUM CHLORIDE: 600; 310; 30; 20 INJECTION, SOLUTION INTRAVENOUS at 15:37:00

## 2019-07-25 RX ADMIN — SODIUM CHLORIDE, SODIUM LACTATE, POTASSIUM CHLORIDE, CALCIUM CHLORIDE: 600; 310; 30; 20 INJECTION, SOLUTION INTRAVENOUS at 15:39:00

## 2019-07-25 RX ADMIN — LIDOCAINE HYDROCHLORIDE 80 MG: 10 INJECTION, SOLUTION EPIDURAL; INFILTRATION; INTRACAUDAL; PERINEURAL at 14:31:00

## 2019-07-25 NOTE — ANESTHESIA POSTPROCEDURE EVALUATION
Patient: Naty Fernandez    Procedure Summary     Date:  07/25/19 Room / Location:  [address] [place] [address] ENDOSCOPY 04 / [address] [place] [address] ENDOSCOPY    Anesthesia Start:  6520 Anesthesia Stop:  3879    Procedures:       COLONOSCOPY (N/A )      ESOPHAGOGASTRODUODENOSCOPY (EGD) (N/A ) Diagnos

## 2019-07-25 NOTE — OPERATIVE REPORT
[facility] Endoscopy Report      Date of Procedure:  07/25/19      Preoperative Diagnosis:  1. Personal history of adenomatous colon polyp  2. Colorectal cancer screening  3. Gastroesophageal reflux and dysphagia  4.   History of [place] The endoscope was withdrawn to the stomach where retroflexion of the angulus, body, cardia and fundus was performed. The instrument was straightened, insufflated air and fluid were suctioned and the endoscope was withdrawn.   The procedures were well bhumi 3–-5 mm fundic gland polyps which were biopsied.   The duodenal bulb and post bulbar regions were normal.  The ampulla was well visualized and normal.    Following diagnostic endoscopy a TTS balloon dilator was positioned across the gastroesophageal junctio

## 2019-07-25 NOTE — ANESTHESIA PREPROCEDURE EVALUATION
Anesthesia PreOp Note    HPI:     Tadeo Fernandez is a 62year old male who presents for preoperative consultation requested by: Estefany Duran MD    Date of Surgery: 7/25/2019    Procedure(s):  COLONOSCOPY  ESOPHAGOGASTRODUODENOSCOPY (EGD)  Indication TIBIA/FIBULA   • OTHER SURGICAL HISTORY Right     BONE LENGTHENING PX ON RT FEMUR   • TOTAL HIP REPLACEMENT Right 04/04/2018   • UPPER GI ENDOSCOPY,EXAM     • VASECTOMY           Medications Prior to Admission:  aspirin 81 MG Oral Tab Take 81 mg by mouth d tobacco: Never Used    Substance and Sexual Activity      Alcohol use:  Yes        Alcohol/week: 3.0 - 4.0 standard drinks        Types: 3 - 4 Standard drinks or equivalent per week        Frequency: 2-3 times a week        Drinks per session: 1 or 2 (5' 10\")         Anesthesia Evaluation     Patient summary reviewed and Nursing notes reviewed    No history of anesthetic complications   Airway   Mallampati: II  TM distance: >3 FB  Neck ROM: full  Dental      Comment: Crown left Front    Pulmonary - ne

## 2019-07-25 NOTE — H&P
History & Physical Examination    Patient Name: Jayne Fernandez  MRN: [id number]  CSN: [id number]  YOB: 1965    Diagnosis: Personal history of adenomatous colon polyp, colorectal cancer screening, gastroesophageal reflux and dysphagia        Med Left 1982    INTERNAL FIXATION TIBIA/FIBULA   • OTHER SURGICAL HISTORY Right     BONE LENGTHENING PX ON RT FEMUR   • TOTAL HIP REPLACEMENT Right 04/04/2018   • UPPER GI ENDOSCOPY,EXAM     • VASECTOMY       Family History   Problem Relation Age of Onset   •

## 2019-07-26 VITALS
WEIGHT: 180 LBS | SYSTOLIC BLOOD PRESSURE: 113 MMHG | RESPIRATION RATE: 12 BRPM | DIASTOLIC BLOOD PRESSURE: 61 MMHG | OXYGEN SATURATION: 97 % | HEART RATE: 51 BPM | BODY MASS INDEX: 25.77 KG/M2 | HEIGHT: 70 IN

## 2019-07-26 RX ORDER — ROSUVASTATIN CALCIUM 5 MG/1
5 TABLET, COATED ORAL DAILY
Qty: 90 TABLET | Refills: 3 | Status: SHIPPED | OUTPATIENT
Start: 2019-07-26 | End: 2020-05-29

## 2019-07-30 ENCOUNTER — TELEPHONE (OUTPATIENT)
Dept: GASTROENTEROLOGY | Facility: CLINIC | Age: 54
End: 2019-07-30

## 2019-07-30 NOTE — TELEPHONE ENCOUNTER
Entered into Epic:Recall colon in 2 years per Dr. Jose William. Last Colon done 7/25/19, next due 7/25/21. HM updated.

## 2019-07-30 NOTE — TELEPHONE ENCOUNTER
----- Message from Fuentes Finney MD sent at 7/29/2019  6:56 PM CDT -----  I spoke to Ursulaayakadarion. He is feeling well. He is coughing less at night but still has difficulty swallowing pills.   We discussed that the endoscopy did not reveal a significant n

## 2019-08-12 RX ORDER — FLECAINIDE ACETATE 150 MG/1
TABLET ORAL
Qty: 180 TABLET | Refills: 1 | Status: SHIPPED | OUTPATIENT
Start: 2019-08-12 | End: 2020-03-25

## 2019-08-26 RX ORDER — PANTOPRAZOLE SODIUM 40 MG/1
TABLET, DELAYED RELEASE ORAL
Qty: 90 TABLET | Refills: 3 | Status: SHIPPED | OUTPATIENT
Start: 2019-08-26 | End: 2020-08-11

## 2019-08-26 NOTE — TELEPHONE ENCOUNTER
Requested Prescriptions     Pending Prescriptions Disp Refills   • PANTOPRAZOLE SODIUM 40 MG Oral Tab EC [Pharmacy Med Name: PANTOPRAZOLE SOD 40MG EC TABLET] 90 tablet 0     Sig: TAKE 1 TABLET BY MOUTH  EVERY MORNING BEFORE  BREAKFAST     lov-7/25/19  lr-1

## 2019-08-29 RX ORDER — METOPROLOL SUCCINATE 50 MG/1
TABLET, EXTENDED RELEASE ORAL
Qty: 90 TABLET | Refills: 1 | Status: SHIPPED | OUTPATIENT
Start: 2019-08-29 | End: 2020-02-14 | Stop reason: SDUPTHER

## 2020-01-21 ENCOUNTER — APPOINTMENT (OUTPATIENT)
Dept: CARDIOLOGY | Age: 55
End: 2020-01-21

## 2020-02-10 ENCOUNTER — HOSPITAL ENCOUNTER (OUTPATIENT)
Dept: CT IMAGING | Facility: HOSPITAL | Age: 55
Discharge: HOME | End: 2020-02-10
Attending: UROLOGY
Payer: COMMERCIAL

## 2020-02-10 ENCOUNTER — TELEPHONE (OUTPATIENT)
Dept: SURGERY | Facility: CLINIC | Age: 55
End: 2020-02-10

## 2020-02-10 DIAGNOSIS — R50.9 PERSISTENT FEVER: ICD-10-CM

## 2020-02-10 DIAGNOSIS — R31.29 MICROHEMATURIA: ICD-10-CM

## 2020-02-10 DIAGNOSIS — R31.29 MICROHEMATURIA: Primary | ICD-10-CM

## 2020-02-10 LAB
CREAT BLD-MCNC: 1.3 MG/DL (ref 0.7–1.3)
CREAT SERPL-MCNC: 1.3 MG/DL

## 2020-02-10 PROCEDURE — 82565 ASSAY OF CREATININE: CPT

## 2020-02-10 PROCEDURE — 74178 CT ABD&PLV WO CNTR FLWD CNTR: CPT | Performed by: UROLOGY

## 2020-02-10 NOTE — TELEPHONE ENCOUNTER
Pt called stating pt's brother spoke to Dr. Eliseo Villafuerte yesterday.   Pt was told to call the office this morning to speak to the nurse to get order for mri today and set up appointment 2-11-20 at 12:20 pm.   Please call to advise

## 2020-02-10 NOTE — TELEPHONE ENCOUNTER
Urology staff,  1) please call patient [phone] or [phone] OR [facility] anesthesia Dr. Enrique Yu Less at [phone] and let him know that I entered stat order for CT urogram and also entered orders for urine specimen for urine culture, complete urinalysis, urine for cytology and also CBC blood test.  2) please put patient in for me 1220--concerning illness; spiking fevers persistently for 2 weeks associated with microhematuria consult. Reconfirm with patient. To come at least 20 minutes if not earlier  To  fill out questionnaires.   Thank you, Dr. Jeffrey Henry

## 2020-02-11 ENCOUNTER — LAB ENCOUNTER (OUTPATIENT)
Dept: LAB | Facility: HOSPITAL | Age: 55
End: 2020-02-11
Attending: UROLOGY
Payer: COMMERCIAL

## 2020-02-11 ENCOUNTER — TELEPHONE (OUTPATIENT)
Dept: SURGERY | Facility: CLINIC | Age: 55
End: 2020-02-11

## 2020-02-11 ENCOUNTER — OFFICE VISIT (OUTPATIENT)
Dept: SURGERY | Facility: CLINIC | Age: 55
End: 2020-02-11
Payer: COMMERCIAL

## 2020-02-11 VITALS
WEIGHT: 175 LBS | BODY MASS INDEX: 25.34 KG/M2 | HEART RATE: 70 BPM | SYSTOLIC BLOOD PRESSURE: 104 MMHG | TEMPERATURE: 99 F | HEIGHT: 69.5 IN | RESPIRATION RATE: 16 BRPM | DIASTOLIC BLOOD PRESSURE: 70 MMHG

## 2020-02-11 DIAGNOSIS — N28.1 RENAL CYST: ICD-10-CM

## 2020-02-11 DIAGNOSIS — R35.1 NOCTURIA: ICD-10-CM

## 2020-02-11 DIAGNOSIS — R31.29 MICROHEMATURIA: Primary | ICD-10-CM

## 2020-02-11 DIAGNOSIS — Z79.82 CURRENT USE OF ASPIRIN: ICD-10-CM

## 2020-02-11 DIAGNOSIS — K57.92 ACUTE DIVERTICULITIS: Primary | ICD-10-CM

## 2020-02-11 DIAGNOSIS — R50.9 FEVER: ICD-10-CM

## 2020-02-11 DIAGNOSIS — R50.9 PERSISTENT FEVER: ICD-10-CM

## 2020-02-11 DIAGNOSIS — B99.9 INFECTION: ICD-10-CM

## 2020-02-11 LAB
ABSOLUTE IMMATURE GRANULOCYTES (OFFPRE24): NORMAL
BACTERIA UR QL AUTO: NEGATIVE /HPF
BASO+EOS+MONOS # BLD: NORMAL 10*3/UL
BASO+EOS+MONOS NFR BLD: NORMAL %
BASOPHILS # BLD AUTO: 0.04 X10(3) UL (ref 0–0.2)
BASOPHILS # BLD: NORMAL 10*3/UL
BASOPHILS NFR BLD AUTO: 0.7 %
BASOPHILS NFR BLD: NORMAL %
BILIRUB UR QL: NEGATIVE
CLARITY UR: CLEAR
COLOR UR: YELLOW
CRP SERPL-MCNC: 6.64 MG/DL (ref ?–0.3)
DEPRECATED RDW RBC AUTO: 40.1 FL (ref 35.1–46.3)
DIFFERENTIAL METHOD BLD: NORMAL
EOSINOPHIL # BLD AUTO: 0.01 X10(3) UL (ref 0–0.7)
EOSINOPHIL # BLD: NORMAL 10*3/UL
EOSINOPHIL NFR BLD AUTO: 0.2 %
EOSINOPHIL NFR BLD: NORMAL %
ERYTHROCYTE [DISTWIDTH] IN BLOOD BY AUTOMATED COUNT: 13.9 % (ref 11–15)
ERYTHROCYTE [DISTWIDTH] IN BLOOD: NORMAL %
GLUCOSE UR-MCNC: NEGATIVE MG/DL
HCT VFR BLD AUTO: 45.5 % (ref 39–53)
HCT VFR BLD CALC: 45.5 %
HGB BLD-MCNC: 14.8 G/DL
HGB BLD-MCNC: 14.8 G/DL (ref 13–17.5)
IMM GRANULOCYTES # BLD AUTO: 0.12 X10(3) UL (ref 0–1)
IMM GRANULOCYTES NFR BLD: 2 %
IMMATURE GRANULOCYTES (OFFPRE25): NORMAL
KETONES UR-MCNC: NEGATIVE MG/DL
LEUKOCYTE ESTERASE UR QL STRIP.AUTO: NEGATIVE
LYMPHOCYTES # BLD AUTO: 0.98 X10(3) UL (ref 1–4)
LYMPHOCYTES # BLD: NORMAL 10*3/UL
LYMPHOCYTES NFR BLD AUTO: 16.6 %
LYMPHOCYTES NFR BLD: NORMAL %
MCH RBC QN AUTO: 25.9 PG (ref 26–34)
MCH RBC QN AUTO: NORMAL PG
MCHC RBC AUTO-ENTMCNC: 32.5 G/DL (ref 31–37)
MCHC RBC AUTO-ENTMCNC: NORMAL G/DL
MCV RBC AUTO: 79.7 FL (ref 80–100)
MCV RBC AUTO: NORMAL FL
MONOCYTES # BLD AUTO: 0.61 X10(3) UL (ref 0.1–1)
MONOCYTES # BLD: NORMAL 10*3/UL
MONOCYTES NFR BLD AUTO: 10.3 %
MONOCYTES NFR BLD: NORMAL %
MPV (OFFPRE2): NORMAL
NEUTROPHILS # BLD AUTO: 4.16 X10 (3) UL (ref 1.5–7.7)
NEUTROPHILS # BLD AUTO: 4.16 X10(3) UL (ref 1.5–7.7)
NEUTROPHILS # BLD: NORMAL 10*3/UL
NEUTROPHILS NFR BLD AUTO: 70.2 %
NEUTROPHILS NFR BLD: NORMAL %
NITRITE UR QL STRIP.AUTO: NEGATIVE
NRBC BLD MANUAL-RTO: NORMAL %
PH UR: 5 [PH] (ref 5–8)
PLAT MORPH BLD: NORMAL
PLATELET # BLD AUTO: 246 10(3)UL (ref 150–450)
PLATELET # BLD: 246 10*3/UL
PROT UR-MCNC: 30 MG/DL
RBC # BLD AUTO: 5.71 X10(6)UL (ref 4.3–5.7)
RBC # BLD: 5.71 10*6/UL
RBC #/AREA URNS AUTO: 1 /HPF
RBC MORPH BLD: NORMAL
SP GR UR STRIP: 1.03 (ref 1–1.03)
UROBILINOGEN UR STRIP-ACNC: <2
WBC # BLD AUTO: 5.9 X10(3) UL (ref 4–11)
WBC # BLD: 5.9 10*3/UL
WBC #/AREA URNS AUTO: 1 /HPF
WBC MORPH BLD: NORMAL

## 2020-02-11 PROCEDURE — 36415 COLL VENOUS BLD VENIPUNCTURE: CPT

## 2020-02-11 PROCEDURE — 85025 COMPLETE CBC W/AUTO DIFF WBC: CPT

## 2020-02-11 PROCEDURE — 88108 CYTOPATH CONCENTRATE TECH: CPT

## 2020-02-11 PROCEDURE — 99204 OFFICE O/P NEW MOD 45 MIN: CPT | Performed by: UROLOGY

## 2020-02-11 PROCEDURE — 99212 OFFICE O/P EST SF 10 MIN: CPT | Performed by: UROLOGY

## 2020-02-11 PROCEDURE — 87086 URINE CULTURE/COLONY COUNT: CPT

## 2020-02-11 PROCEDURE — 81001 URINALYSIS AUTO W/SCOPE: CPT | Performed by: UROLOGY

## 2020-02-11 PROCEDURE — 86140 C-REACTIVE PROTEIN: CPT | Performed by: UROLOGY

## 2020-02-11 RX ORDER — LEVOFLOXACIN 500 MG/1
TABLET, FILM COATED ORAL
COMMUNITY
Start: 2020-02-07 | End: 2020-08-18

## 2020-02-11 NOTE — PROGRESS NOTES
Naty Fernandez is a 62year old male. HPI:   Patient presents with:  Hematuria: microhematuria with fever started 10 days ago       History provided by patient. Previous patient of Dr. Jerry Coates.        Microhematuria  Patient complains of associated fever that prostate had \"some degree of trabeculation and cellular formation. ..noevidence of tumor. .. \". Dr George Davison on May 18, 2010 the patient was on Flomax 0.8 mg daily and AUA score was 18. Physical exam showed 30 gram prostate.  June 18, 2010, [facility]; PSA= 1.25    Urological History-- Patient is previous patient of Dr. Tha Forbes in 2015. Smoking History & Fume Exposure-- Patient is a non-smoker. Family History-- Patient's mother had skin cancer.  Patient's father has dementia      H PANTOPRAZOLE SODIUM 40 MG Oral Tab EC TAKE 1 TABLET BY MOUTH  EVERY MORNING BEFORE  BREAKFAST 90 tablet 3   • aspirin 81 MG Oral Tab Take 81 mg by mouth daily. • Flecainide Acetate 100 MG Oral Tab Take 100 mg by mouth 2 (two) times daily.      • Metopro anxiety, depression, and insomnia  Respiratory:  Negative for cough, dyspnea and wheezing      PHYSICAL EXAM:   Constitutional: appears well hydrated alert and responsive no acute distress noted  Neurological: Oriented to time, place, person with normal af [facility]) UA WBC= 1-3; RBC= 8-10  2/5/2020 Creatinine= 1.21; GFR= 62  2/5/2020 [place] CBC WBC 6.01 normal and neutrophil #4.02 normal.  2/7/20 urine culture reflex = \"your value not indicated. ..  Urine culture not indicated based on the outcome Patient had blood cultures submitted before he started Levaquin; negative thus far; he also had urine culture before starting Levaquin which came back negative.  I fully explained to patient the benefits, risks, complications, side effects, reasons for, zara answered patient's questions on treatment; patient understands all of this and decides to observe for now.     (N28.1) Renal cyst  On 2/10/2020 CT Urogram (W+WO)= small 1.2 cm minimially complex proteinaceous/hemorrhagic left interpolar renal cyst; simple- will do my best to keep you informed of  all significant urological  developments. Dr. Debi Perez M.D., FACS       Orders This Visit:  No orders of the defined types were placed in this encounter.       Meds This Visit:  Requested Prescription

## 2020-02-11 NOTE — PATIENT INSTRUCTIONS
Dominique Hamm M.D.    1.    For now please continue daily Levaquin as you are doing    2.   Please make consult appointment to see infectious disease specialist ( such as Dr. Alli Brar  for an opinion as to whether or no

## 2020-02-17 RX ORDER — METOPROLOL SUCCINATE 50 MG/1
TABLET, EXTENDED RELEASE ORAL
Qty: 90 TABLET | Refills: 1 | Status: SHIPPED | OUTPATIENT
Start: 2020-02-17 | End: 2020-08-04

## 2020-02-27 ENCOUNTER — TELEPHONE (OUTPATIENT)
Dept: CARDIOLOGY | Age: 55
End: 2020-02-27

## 2020-02-27 DIAGNOSIS — I48.0 PAROXYSMAL ATRIAL FIBRILLATION (CMD): ICD-10-CM

## 2020-02-27 DIAGNOSIS — E78.00 PURE HYPERCHOLESTEROLEMIA: Primary | ICD-10-CM

## 2020-03-25 RX ORDER — FLECAINIDE ACETATE 150 MG/1
TABLET ORAL
Qty: 180 TABLET | Refills: 1 | Status: SHIPPED | OUTPATIENT
Start: 2020-03-25 | End: 2020-08-18 | Stop reason: ALTCHOICE

## 2020-05-29 RX ORDER — ROSUVASTATIN CALCIUM 5 MG/1
5 TABLET, COATED ORAL DAILY
Qty: 90 TABLET | Refills: 0 | Status: SHIPPED | OUTPATIENT
Start: 2020-05-29 | End: 2020-08-25

## 2020-06-15 ENCOUNTER — CLINICAL ABSTRACT (OUTPATIENT)
Dept: CARDIOLOGY | Age: 55
End: 2020-06-15

## 2020-06-15 LAB
ALBUMIN SERPL-MCNC: 4.4 G/DL
ALP SERPL-CCNC: 68 U/L
ALT SERPL-CCNC: 28 U/L
AST SERPL-CCNC: 23 U/L
BILIRUB SERPL-MCNC: 0.7 MG/DL
BUN SERPL-MCNC: 22 MG/DL
CALCIUM SERPL-MCNC: 9.5 MG/DL
CHLORIDE SERPL-SCNC: 105 MMOL/L
CHOLEST SERPL-MCNC: 176 MG/DL
CREAT SERPL-MCNC: 1.19 MG/DL
GLUCOSE SERPL-MCNC: 106 MG/DL
HDLC SERPL-MCNC: 49 MG/DL
LDLC SERPL CALC-MCNC: 106 MG/DL
NONHDLC SERPL-MCNC: 127 MG/DL
POTASSIUM SERPL-SCNC: 4.7 MMOL/L
PROT SERPL-MCNC: 7 G/DL
SODIUM SERPL-SCNC: 140 MMOL/L
TRIGL SERPL-MCNC: 105 MG/DL

## 2020-06-16 ENCOUNTER — TELEPHONE (OUTPATIENT)
Dept: CARDIOLOGY | Age: 55
End: 2020-06-16

## 2020-06-16 ENCOUNTER — APPOINTMENT (OUTPATIENT)
Dept: CARDIOLOGY | Age: 55
End: 2020-06-16

## 2020-06-23 ENCOUNTER — TELEPHONE (OUTPATIENT)
Dept: CARDIOLOGY | Age: 55
End: 2020-06-23

## 2020-07-27 NOTE — TELEPHONE ENCOUNTER
Nursing: The patient has not been seen in office in >1-year. I would recommend an annual office follow-up.   If he requires a refill prior to the office visit, please let me know otherwise will refill at the time of the office visit

## 2020-07-27 NOTE — TELEPHONE ENCOUNTER
Refill request for the below medication:    LR-8/26/2019  LOV-7/25/2019    PANTOPRAZOLE SODIUM 40 MG Oral Tab EC 90 tablet 3 8/26/2019    Sig:   TAKE 1 TABLET BY MOUTH  EVERY MORNING BEFORE  BREAKFAST     Route:   (none)     Order #:   [id number]

## 2020-07-28 NOTE — TELEPHONE ENCOUNTER
LMTCB. Please transfer to triage. Please assist patient in scheduling  a f/u appointment with Komal Cat when he calls back. If patient needs a refill before then, please send message back to us after scheduling an appointment. Thank you.

## 2020-07-28 NOTE — PROGRESS NOTES
[address] [facility] [address] Gastroenterology                                                                                                      Clinic Follow-up Visit    No c use   - Occupation:   - Lives with spouse  - NSAIDs/ASA use: ASA 81 mg daily      History, Medications, Allergies, ROS:      Past Medical History:   Diagnosis Date   • Arrhythmia     Afib   • Asthma    • Back problem    • BPH (benign prostatic hype 81 mg by mouth daily.      • ferrous sulfate 325 (65 FE) MG Oral Tab EC Take 1 tablet (325 mg total) by mouth daily with breakfast. (Patient not taking: Reported on 2/11/2020 ) 30 tablet 0   • Flecainide Acetate 100 MG Oral Tab Take 100 mg by mouth 2 (two) behavior is normal    Nursing note and vitals reviewed      Labs/Imaging:     Patient's labs and imaging were reviewed and discussed with patient today. See HPI and A&P for further details.      .  ASSESSMENT/PLAN:   Marlena Fernandez is a 60year old year-old

## 2020-08-04 RX ORDER — METOPROLOL SUCCINATE 50 MG/1
TABLET, EXTENDED RELEASE ORAL
Qty: 90 TABLET | Refills: 0 | Status: SHIPPED | OUTPATIENT
Start: 2020-08-04 | End: 2020-08-18 | Stop reason: ALTCHOICE

## 2020-08-04 RX ORDER — PANTOPRAZOLE SODIUM 40 MG/1
TABLET, DELAYED RELEASE ORAL
Qty: 90 TABLET | Refills: 3 | OUTPATIENT
Start: 2020-08-04

## 2020-08-10 NOTE — TELEPHONE ENCOUNTER
Requested Prescriptions     Pending Prescriptions Disp Refills   • PANTOPRAZOLE SODIUM 40 MG Oral Tab EC [Pharmacy Med Name: PANTOPRAZOLE SOD 40MG EC TABLET] 90 tablet 3     Sig: TAKE 1 TABLET BY MOUTH  EVERY MORNING BEFORE  BREAKFAST     LOV 8/26/19  LR 8

## 2020-08-11 ENCOUNTER — OFFICE VISIT (OUTPATIENT)
Dept: GASTROENTEROLOGY | Facility: CLINIC | Age: 55
End: 2020-08-11
Payer: COMMERCIAL

## 2020-08-11 VITALS
SYSTOLIC BLOOD PRESSURE: 130 MMHG | BODY MASS INDEX: 26.34 KG/M2 | HEIGHT: 70 IN | TEMPERATURE: 97 F | HEART RATE: 56 BPM | WEIGHT: 184 LBS | DIASTOLIC BLOOD PRESSURE: 81 MMHG

## 2020-08-11 DIAGNOSIS — K21.9 HIATAL HERNIA WITH GERD: Primary | ICD-10-CM

## 2020-08-11 DIAGNOSIS — K44.9 HIATAL HERNIA WITH GERD: Primary | ICD-10-CM

## 2020-08-11 DIAGNOSIS — Z87.19 HISTORY OF DIVERTICULITIS: ICD-10-CM

## 2020-08-11 PROCEDURE — 3008F BODY MASS INDEX DOCD: CPT | Performed by: NURSE PRACTITIONER

## 2020-08-11 PROCEDURE — 99212 OFFICE O/P EST SF 10 MIN: CPT | Performed by: NURSE PRACTITIONER

## 2020-08-11 PROCEDURE — 99214 OFFICE O/P EST MOD 30 MIN: CPT | Performed by: NURSE PRACTITIONER

## 2020-08-11 PROCEDURE — 3079F DIAST BP 80-89 MM HG: CPT | Performed by: NURSE PRACTITIONER

## 2020-08-11 PROCEDURE — 3075F SYST BP GE 130 - 139MM HG: CPT | Performed by: NURSE PRACTITIONER

## 2020-08-11 RX ORDER — ROSUVASTATIN CALCIUM 5 MG/1
5 TABLET, COATED ORAL DAILY
COMMUNITY
Start: 2020-05-29

## 2020-08-11 RX ORDER — PANTOPRAZOLE SODIUM 40 MG/1
TABLET, DELAYED RELEASE ORAL
Qty: 90 TABLET | Refills: 3 | OUTPATIENT
Start: 2020-08-11

## 2020-08-11 RX ORDER — PANTOPRAZOLE SODIUM 40 MG/1
40 TABLET, DELAYED RELEASE ORAL
Qty: 90 TABLET | Refills: 3 | Status: SHIPPED | OUTPATIENT
Start: 2020-08-11 | End: 2021-06-22

## 2020-08-11 NOTE — PROGRESS NOTES
[facility] Follow-up Visit    Dulce diverticulosis, 3 cm hiatal hernia, gastric polyps, no significant narrowing at the GE junction though dilatation was performed, 2-year recall based on adenoma size/piecemeal removal, consider escalating PPI therapy versus esophagram with solid component Comment: on occ    Drug use: No       Medications (Active prior to today's visit):  Current Outpatient Medications   Medication Sig Dispense Refill   • Rosuvastatin Calcium 5 MG Oral Tab Take 5 mg by mouth daily.      • Pantoprazole Sodium 40 MG Oral Tab EC and rhythm, the extremities are warm and well perfused   Lung: effort normal and breath sounds normal, no respiratory distress, wheezes or rales  GI: soft, non-tender exam in all quadrants without rigidity or guarding, non-distended, no abnormal bowel soun evaluation. Recommend:  -Continue pantoprazole 40 mg daily  -Follow-up in 1 year or sooner if new issues arise      Orders This Visit:  No orders of the defined types were placed in this encounter.       Meds This Visit:  Requested Prescriptions

## 2020-08-14 PROBLEM — Z79.899 ENCOUNTER FOR LONG-TERM (CURRENT) DRUG USE: Status: ACTIVE | Noted: 2020-08-14

## 2020-08-18 ENCOUNTER — OFFICE VISIT (OUTPATIENT)
Dept: CARDIOLOGY | Age: 55
End: 2020-08-18

## 2020-08-18 ENCOUNTER — OFFICE VISIT (OUTPATIENT)
Dept: PAIN CLINIC | Facility: HOSPITAL | Age: 55
End: 2020-08-18
Attending: ANESTHESIOLOGY
Payer: COMMERCIAL

## 2020-08-18 ENCOUNTER — TELEPHONE (OUTPATIENT)
Dept: PAIN CLINIC | Facility: HOSPITAL | Age: 55
End: 2020-08-18

## 2020-08-18 VITALS
RESPIRATION RATE: 18 BRPM | HEIGHT: 70 IN | WEIGHT: 184 LBS | HEART RATE: 48 BPM | DIASTOLIC BLOOD PRESSURE: 69 MMHG | SYSTOLIC BLOOD PRESSURE: 111 MMHG | BODY MASS INDEX: 26.34 KG/M2

## 2020-08-18 VITALS
DIASTOLIC BLOOD PRESSURE: 60 MMHG | WEIGHT: 184 LBS | HEIGHT: 70 IN | RESPIRATION RATE: 18 BRPM | BODY MASS INDEX: 26.34 KG/M2 | OXYGEN SATURATION: 99 % | SYSTOLIC BLOOD PRESSURE: 122 MMHG | HEART RATE: 52 BPM

## 2020-08-18 DIAGNOSIS — I48.0 PAROXYSMAL ATRIAL FIBRILLATION (CMD): Primary | ICD-10-CM

## 2020-08-18 DIAGNOSIS — M54.50 CHRONIC LOW BACK PAIN, UNSPECIFIED BACK PAIN LATERALITY, UNSPECIFIED WHETHER SCIATICA PRESENT: Primary | ICD-10-CM

## 2020-08-18 DIAGNOSIS — M47.817 SPONDYLOSIS OF LUMBOSACRAL REGION, UNSPECIFIED SPINAL OSTEOARTHRITIS COMPLICATION STATUS: ICD-10-CM

## 2020-08-18 DIAGNOSIS — Z79.899 ENCOUNTER FOR LONG-TERM (CURRENT) DRUG USE: ICD-10-CM

## 2020-08-18 DIAGNOSIS — G89.29 CHRONIC LOW BACK PAIN, UNSPECIFIED BACK PAIN LATERALITY, UNSPECIFIED WHETHER SCIATICA PRESENT: Primary | ICD-10-CM

## 2020-08-18 DIAGNOSIS — R00.2 PALPITATION: ICD-10-CM

## 2020-08-18 PROCEDURE — 93000 ELECTROCARDIOGRAM COMPLETE: CPT | Performed by: INTERNAL MEDICINE

## 2020-08-18 PROCEDURE — 99201 HC OUTPT EVAL AND MGNT NEW PT LEVEL 1: CPT

## 2020-08-18 PROCEDURE — 99214 OFFICE O/P EST MOD 30 MIN: CPT | Performed by: INTERNAL MEDICINE

## 2020-08-18 RX ORDER — PROPAFENONE HYDROCHLORIDE 425 MG/1
425 CAPSULE, EXTENDED RELEASE ORAL 2 TIMES DAILY
Qty: 60 CAPSULE | Refills: 11 | Status: SHIPPED | OUTPATIENT
Start: 2020-08-18 | End: 2020-09-14 | Stop reason: SDUPTHER

## 2020-08-18 ASSESSMENT — PATIENT HEALTH QUESTIONNAIRE - PHQ9
1. LITTLE INTEREST OR PLEASURE IN DOING THINGS: SEVERAL DAYS
CLINICAL INTERPRETATION OF PHQ9 SCORE: NO FURTHER SCREENING NEEDED
CLINICAL INTERPRETATION OF PHQ2 SCORE: NO FURTHER SCREENING NEEDED
2. FEELING DOWN, DEPRESSED OR HOPELESS: SEVERAL DAYS
SUM OF ALL RESPONSES TO PHQ9 QUESTIONS 1 AND 2: 2
SUM OF ALL RESPONSES TO PHQ9 QUESTIONS 1 AND 2: 2

## 2020-08-18 NOTE — PROGRESS NOTES
8/18/2020  Presents ambulatory to [facility];  New consult c/o CHRONIC LBP;  Pt has been seen by [facility] 2023, hx of injections; Examined by Dr. Miesha Packer; refer to dictation for the POC.

## 2020-08-18 NOTE — TELEPHONE ENCOUNTER
Asked by Dr. Shana Shah to place order for MRI lumbar spine without contrast for this patient. Order placed. He will notify the patient.

## 2020-08-18 NOTE — CHRONIC PAIN
[facility]  Pain Consultation     HISTORY OF PRESENT ILLNESS:  Tadeo Fernandez is a 60year old old male referred to the pain clinic by Dr. Painting ref.  provider found for Chronic low back pain, unspecified back pain laterality, unspecified whether • aspirin 81 MG Oral Tab Take 81 mg by mouth daily. • Flecainide Acetate 100 MG Oral Tab Take 100 mg by mouth 2 (two) times daily. • Metoprolol Succinate ER 25 MG Oral Tablet 24 Hr Take 50 mg by mouth daily.        • ADVAIR DISKUS 250-50 MCG/DOSE lymphedema  Allergic/Immunologic:  Negative  Musculoskeletal: As above  Neurological: As above    MEDICAL HISTORY:  Patient Active Problem List:     GERD (gastroesophageal reflux disease)     History of colon polyps     Primary osteoarthritis of right hip phone: Not on file        Gets together: Not on file        Attends Confucianist service: Not on file        Active member of club or organization: Not on file        Attends meetings of clubs or organizations: Not on file        Relationship status: Not on f Flexion 120 No   Left Extension 0 No     MOTOR EXAMINATION:  LOWER EXTREMITY      LEFT RIGHT   Iliopsoas 5/5 5/5   Quadriceps 5/5 5/5   Foot DF 5/5 5/5   Foot EHL 5/5 5/5   Gastrocnemius 5/5 5/5     PULSES      LEFT RIGHT   Dorsalis Pedis 2/4 2/4   Posteri analgesic plan was formulated. Conservative vs. Aggressive measures were discussed at length including pharmacotherapy (eg. Anti- inflammatories, muscle relaxants, neuropathic medications, oral steroids, analgesics), injections, and further testing.  Risks

## 2020-08-25 RX ORDER — ROSUVASTATIN CALCIUM 5 MG/1
5 TABLET, COATED ORAL DAILY
Qty: 90 TABLET | Refills: 3 | Status: SHIPPED | OUTPATIENT
Start: 2020-08-25

## 2020-08-26 ENCOUNTER — DOCUMENTATION ONLY (OUTPATIENT)
Dept: PAIN CLINIC | Facility: HOSPITAL | Age: 55
End: 2020-08-26

## 2020-08-27 ENCOUNTER — TELEPHONE (OUTPATIENT)
Dept: PAIN CLINIC | Facility: HOSPITAL | Age: 55
End: 2020-08-27

## 2020-08-27 NOTE — PROGRESS NOTES
Called [facility] @ 2:06 pm  @ # [phone] with Fidelina West, all clinical information given over the phone. Call was then transferred to the Nurse  Paula FUCHS, all clinical information given over the phone once again.  Nurse  stated

## 2020-08-27 NOTE — TELEPHONE ENCOUNTER
Called [facility] @ 10:34 am @ [phone]    Spoke with Mrs. Al, case is still under medical review. Per Nurse case manage pt's are usually require to have 6 weeks of conservative Tx and a f/u appt  With the ordering provider.  Representative will send it for

## 2020-09-08 RX ORDER — METOPROLOL SUCCINATE 50 MG/1
TABLET, EXTENDED RELEASE ORAL
Qty: 90 TABLET | Refills: 3 | OUTPATIENT
Start: 2020-09-08

## 2020-09-08 RX ORDER — FLECAINIDE ACETATE 150 MG/1
TABLET ORAL
Qty: 180 TABLET | Refills: 3 | OUTPATIENT
Start: 2020-09-08

## 2020-09-14 ENCOUNTER — TELEPHONE (OUTPATIENT)
Dept: CARDIOLOGY | Age: 55
End: 2020-09-14

## 2020-09-14 RX ORDER — PROPAFENONE HYDROCHLORIDE 425 MG/1
425 CAPSULE, EXTENDED RELEASE ORAL 2 TIMES DAILY
Qty: 180 CAPSULE | Refills: 1 | Status: SHIPPED | OUTPATIENT
Start: 2020-09-14 | End: 2021-01-26

## 2020-09-14 RX ORDER — FLECAINIDE ACETATE 150 MG/1
TABLET ORAL
Qty: 180 TABLET | Refills: 3 | OUTPATIENT
Start: 2020-09-14

## 2020-09-14 RX ORDER — PROPAFENONE HYDROCHLORIDE 425 MG/1
425 CAPSULE, EXTENDED RELEASE ORAL 2 TIMES DAILY
Qty: 60 CAPSULE | Refills: 11 | Status: SHIPPED | OUTPATIENT
Start: 2020-09-14 | End: 2020-09-14 | Stop reason: SDUPTHER

## 2020-10-26 RX ORDER — METOPROLOL SUCCINATE 50 MG/1
TABLET, EXTENDED RELEASE ORAL
Qty: 90 TABLET | Refills: 1 | Status: SHIPPED | OUTPATIENT
Start: 2020-10-26 | End: 2021-02-02 | Stop reason: ALTCHOICE

## 2021-01-26 ENCOUNTER — TELEPHONE (OUTPATIENT)
Dept: CARDIOLOGY | Age: 56
End: 2021-01-26

## 2021-01-26 RX ORDER — PROPAFENONE HYDROCHLORIDE 425 MG/1
CAPSULE, EXTENDED RELEASE ORAL
Qty: 180 CAPSULE | Refills: 0 | Status: SHIPPED | OUTPATIENT
Start: 2021-01-26 | End: 2021-04-19

## 2021-02-01 ENCOUNTER — DOCUMENTATION (OUTPATIENT)
Dept: CARDIOLOGY | Age: 56
End: 2021-02-01

## 2021-02-02 ENCOUNTER — ANCILLARY PROCEDURE (OUTPATIENT)
Dept: CARDIOLOGY | Age: 56
End: 2021-02-02
Attending: INTERNAL MEDICINE

## 2021-02-02 ENCOUNTER — OFFICE VISIT (OUTPATIENT)
Dept: CARDIOLOGY | Age: 56
End: 2021-02-02

## 2021-02-02 VITALS
SYSTOLIC BLOOD PRESSURE: 124 MMHG | BODY MASS INDEX: 26.13 KG/M2 | DIASTOLIC BLOOD PRESSURE: 74 MMHG | WEIGHT: 182.5 LBS | HEIGHT: 70 IN | HEART RATE: 57 BPM

## 2021-02-02 VITALS — HEIGHT: 70 IN | WEIGHT: 184 LBS | BODY MASS INDEX: 26.34 KG/M2

## 2021-02-02 DIAGNOSIS — Z79.899 ENCOUNTER FOR LONG-TERM (CURRENT) DRUG USE: ICD-10-CM

## 2021-02-02 DIAGNOSIS — I48.0 PAROXYSMAL ATRIAL FIBRILLATION (CMD): ICD-10-CM

## 2021-02-02 DIAGNOSIS — R00.2 PALPITATION: Primary | ICD-10-CM

## 2021-02-02 LAB
LV EF: 53 %
STRESS POST EXERCISE DUR SEC: 39 SEC
STRESS TARGET HR: 163 BPM

## 2021-02-02 PROCEDURE — 99214 OFFICE O/P EST MOD 30 MIN: CPT | Performed by: INTERNAL MEDICINE

## 2021-02-02 PROCEDURE — 78452 HT MUSCLE IMAGE SPECT MULT: CPT | Performed by: INTERNAL MEDICINE

## 2021-02-02 PROCEDURE — A9502 TC99M TETROFOSMIN: HCPCS | Performed by: INTERNAL MEDICINE

## 2021-02-02 PROCEDURE — 93015 CV STRESS TEST SUPVJ I&R: CPT | Performed by: INTERNAL MEDICINE

## 2021-02-02 RX ORDER — REGADENOSON 0.08 MG/ML
0.4 INJECTION, SOLUTION INTRAVENOUS ONCE
Status: COMPLETED | OUTPATIENT
Start: 2021-02-02 | End: 2021-02-02

## 2021-02-02 RX ADMIN — REGADENOSON 0.4 MG: 0.08 INJECTION, SOLUTION INTRAVENOUS at 09:25

## 2021-02-02 ASSESSMENT — EXERCISE STRESS TEST
COMMENTS: 3 MINUTE RECOVERY
PEAK_RPP: 9660
STAGE_CATEGORIES: RECOVERY 0
COMMENTS: 2 MINUTE RECOVERY; SYMPTOMS RESOLVING
COMMENTS: 20 SECOND RECOVERY
STAGE_CATEGORIES: 1
PEAK_HR: 74
PEAK_HR: 58
PEAK_RPP: 8576
PEAK_BP: 138/82
PEAK_BP: 138/82
PEAK_RPP: 9176
COMMENTS: 5 MINUTE RECOVERY
PEAK_BP: 154/84
PEAK_BP: 124/84
COMMENTS: INJECTED AT :30
PEAK_HR: 66
STOPPAGE_REASON: PROTOCOL COMPLETE
PEAK_HR: 70
PEAK_RPP: 8932
PEAK_BP: 128/70
STAGE_CATEGORIES: RECOVERY 1
STRESS_SYMPTOMS: DYSPNEA
STAGE_CATEGORIES: RESTING
PEAK_HR: 60
STAGE_CATEGORIES: RECOVERY 3
STAGE_CATEGORIES: RECOVERY 2
PEAK_HR: 67
PEAK_RPP: 9108

## 2021-02-03 ENCOUNTER — TELEPHONE (OUTPATIENT)
Dept: CARDIOLOGY | Age: 56
End: 2021-02-03

## 2021-02-03 ENCOUNTER — E-ADVICE (OUTPATIENT)
Dept: CARDIOLOGY | Age: 56
End: 2021-02-03

## 2021-02-06 ENCOUNTER — E-ADVICE (OUTPATIENT)
Dept: CARDIOLOGY | Age: 56
End: 2021-02-06

## 2021-04-05 ENCOUNTER — LAB ENCOUNTER (OUTPATIENT)
Dept: LAB | Facility: HOSPITAL | Age: 56
End: 2021-04-05
Attending: UROLOGY
Payer: COMMERCIAL

## 2021-04-05 ENCOUNTER — OFFICE VISIT (OUTPATIENT)
Dept: SURGERY | Facility: CLINIC | Age: 56
End: 2021-04-05
Payer: COMMERCIAL

## 2021-04-05 VITALS
WEIGHT: 184 LBS | SYSTOLIC BLOOD PRESSURE: 118 MMHG | DIASTOLIC BLOOD PRESSURE: 70 MMHG | HEIGHT: 70 IN | BODY MASS INDEX: 26.34 KG/M2 | HEART RATE: 65 BPM

## 2021-04-05 DIAGNOSIS — N28.1 RENAL CYST: ICD-10-CM

## 2021-04-05 DIAGNOSIS — R35.1 NOCTURIA: ICD-10-CM

## 2021-04-05 DIAGNOSIS — Z12.5 PROSTATE CANCER SCREENING: ICD-10-CM

## 2021-04-05 DIAGNOSIS — R31.29 MICROHEMATURIA: Primary | ICD-10-CM

## 2021-04-05 DIAGNOSIS — N40.1 BENIGN PROSTATIC HYPERPLASIA WITH URINARY FREQUENCY: ICD-10-CM

## 2021-04-05 DIAGNOSIS — R35.0 BENIGN PROSTATIC HYPERPLASIA WITH URINARY FREQUENCY: ICD-10-CM

## 2021-04-05 DIAGNOSIS — Z79.82 CURRENT USE OF ASPIRIN: ICD-10-CM

## 2021-04-05 PROCEDURE — 3008F BODY MASS INDEX DOCD: CPT | Performed by: UROLOGY

## 2021-04-05 PROCEDURE — 99214 OFFICE O/P EST MOD 30 MIN: CPT | Performed by: UROLOGY

## 2021-04-05 PROCEDURE — 51798 US URINE CAPACITY MEASURE: CPT | Performed by: UROLOGY

## 2021-04-05 PROCEDURE — 3078F DIAST BP <80 MM HG: CPT | Performed by: UROLOGY

## 2021-04-05 PROCEDURE — 3074F SYST BP LT 130 MM HG: CPT | Performed by: UROLOGY

## 2021-04-05 PROCEDURE — 36415 COLL VENOUS BLD VENIPUNCTURE: CPT

## 2021-04-05 RX ORDER — DIAZEPAM 10 MG/1
TABLET ORAL
Qty: 1 TABLET | Refills: 0 | Status: SHIPPED | OUTPATIENT
Start: 2021-04-05

## 2021-04-05 NOTE — PATIENT INSTRUCTIONS
Myron Otero M.D.    1.  Urine specimen for complete urinalysis today    2. Transrectal ultrasound of the prostate--no biopsy .   [facility].  Please schedule    3.  .  Blood draw for PSA––prostate c

## 2021-04-05 NOTE — PROGRESS NOTES
HPI:    Patient ID: Shanell Fernandez is a 60year old male. HPI     Microhematuria  Problem started 02/07/2020 when ([address]) UA RBC = 8-10; associated persistent fevers; treated with Levaquin 500 mg daily for 10 days; fever resolved. April 14, 2008, the patient had cystoscopy with dilation of urethra under MAC anesthesia. Mild stricture was found and middle lobe of the prostate had \"some degree of trabeculation and cellular formation. ..noevidence of tumor. .. \".  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 Allergies:   Ancef [Cefazolin]       ITCHING  Compazine [Prochlor*        Comment:LOCK JAW  Seasonal He is well-developed. HENT:      Head: Normocephalic and atraumatic. Pulmonary:      Effort: Pulmonary effort is normal. No respiratory distress. Genitourinary:     Comments:  On KAILA, prostate 1-2+ enlarged, 30-35 g, wide, flat, soft, no palpable nodu proteinaceous/hemorrhagic left interpolar renal cyst; simple-appearing left lower pole renal cyst; borderline prostatomegaly with findings suggesting chronic partial urinary bladder outlet obstruction     02/05/2020 XR Chest 2 Views [facility])= no episodes of gross hematuria. He feels this is stable and chooses to continue observation. The patient submits urine specimen today for UA.      (N40.1,  R35.0) Benign prostatic hyperplasia with urinary frequency  Chronic.  On KAILA today, prostate 1-2+ enlarg patient that renal cysts are common over the age of 36; they are not cancerous or precancerous and no further treatment is needed; he understood and agreed.       (Z79.82) Current use of aspirin  Patient is currently taking aspirin 81 mg daily and at risk ([id number])     TC#[id number]    By signing my name below, Bacilio Goodwin,  attest that this documentation has been prepared under the direction and in the presence of Tomasz Galindo MD.   Electronically Signed: Emilia Edgar, 4/5/2021, 3:14 PM.     Madelin CHEN

## 2021-04-19 RX ORDER — PROPAFENONE HYDROCHLORIDE 425 MG/1
CAPSULE, EXTENDED RELEASE ORAL
Qty: 180 CAPSULE | Refills: 1 | Status: SHIPPED | OUTPATIENT
Start: 2021-04-19

## 2021-04-21 ENCOUNTER — TELEPHONE (OUTPATIENT)
Dept: SURGERY | Facility: CLINIC | Age: 56
End: 2021-04-21

## 2021-04-21 NOTE — TELEPHONE ENCOUNTER
Called INS to verify if patient needed prior auth for upcoming cysto procedure with PVK    No prior auth needed  Ref # call

## 2021-04-26 ENCOUNTER — PROCEDURE (OUTPATIENT)
Dept: SURGERY | Facility: CLINIC | Age: 56
End: 2021-04-26
Payer: COMMERCIAL

## 2021-04-26 VITALS
BODY MASS INDEX: 26.34 KG/M2 | HEIGHT: 70 IN | DIASTOLIC BLOOD PRESSURE: 80 MMHG | SYSTOLIC BLOOD PRESSURE: 120 MMHG | RESPIRATION RATE: 16 BRPM | WEIGHT: 184 LBS | HEART RATE: 62 BPM

## 2021-04-26 DIAGNOSIS — R31.29 MICROHEMATURIA: ICD-10-CM

## 2021-04-26 DIAGNOSIS — R35.0 BENIGN PROSTATIC HYPERPLASIA WITH URINARY FREQUENCY: Primary | ICD-10-CM

## 2021-04-26 DIAGNOSIS — N28.1 RENAL CYST: ICD-10-CM

## 2021-04-26 DIAGNOSIS — R35.1 NOCTURIA: ICD-10-CM

## 2021-04-26 DIAGNOSIS — Z12.5 PROSTATE CANCER SCREENING: ICD-10-CM

## 2021-04-26 DIAGNOSIS — Z79.82 CURRENT USE OF ASPIRIN: ICD-10-CM

## 2021-04-26 DIAGNOSIS — N40.1 BENIGN PROSTATIC HYPERPLASIA WITH URINARY FREQUENCY: Primary | ICD-10-CM

## 2021-04-26 PROCEDURE — 51741 ELECTRO-UROFLOWMETRY FIRST: CPT | Performed by: UROLOGY

## 2021-04-26 PROCEDURE — 3074F SYST BP LT 130 MM HG: CPT | Performed by: UROLOGY

## 2021-04-26 PROCEDURE — 3008F BODY MASS INDEX DOCD: CPT | Performed by: UROLOGY

## 2021-04-26 PROCEDURE — 52000 CYSTOURETHROSCOPY: CPT | Performed by: UROLOGY

## 2021-04-26 PROCEDURE — 3079F DIAST BP 80-89 MM HG: CPT | Performed by: UROLOGY

## 2021-04-26 PROCEDURE — 51798 US URINE CAPACITY MEASURE: CPT | Performed by: UROLOGY

## 2021-04-26 RX ORDER — PHENAZOPYRIDINE HYDROCHLORIDE 200 MG/1
200 TABLET, FILM COATED ORAL 3 TIMES DAILY PRN
Qty: 15 TABLET | Refills: 1 | Status: SHIPPED | OUTPATIENT
Start: 2021-04-26

## 2021-04-26 RX ORDER — PROPAFENONE HYDROCHLORIDE 425 MG/1
CAPSULE, EXTENDED RELEASE ORAL
COMMUNITY
Start: 2020-08-01

## 2021-04-26 RX ORDER — CIPROFLOXACIN 500 MG/1
500 TABLET, FILM COATED ORAL ONCE
Status: COMPLETED | OUTPATIENT
Start: 2021-04-26 | End: 2021-04-26

## 2021-04-26 RX ADMIN — CIPROFLOXACIN 500 MG: 500 TABLET, FILM COATED ORAL at 16:09:00

## 2021-04-26 NOTE — PROGRESS NOTES
PREOPERATIVE DIAGNOSIS:     Microhematuria, Nocturia, and Weak stream      POSTOP DIAGNOSIS:                  The same;  No tumors, stones, or CIS of the bladder or bladder neck     PROCEDURE:              Cystoscopy            ANESTHESIA:     2% Xylocaine treatment options of the following urological problems :    Microhematuria  Problem started 02/07/2020 when ([address]) UA RBC = 8-10; associated persistent fevers; treated with Levaquin 500 mg daily for 10 days; fever resolved.  02/10/2 side effects. He feels this is stable.           So that we could have a meaningful and appropriate discussion with patient concerning further treatment options, I review with patient the past urological history and I reviewed chart with patient as below . category; nocturia 3x;  On KAILA, 0.5-1+ enlarged, 25 grams, without nodules or indurations; chooses observation  8/7/2015 Surgery center; cystoscopy with dilation of mild stricture proximal ulbar urethra and bladder neck under heavy IV sedation; endoscopic B protein = negative; Microscopic not indicated   02/11/2020 Urine cytology = negative for high-grade urothelial carcinoma; Urine culture = no growth 2 days; UA blood = small; WBC = 1; RBC = 1; C-Reactive Protein = 6.64 high; CBC differential with platelet; flow rate = 3.4 ml/s  Shape of uroflow curve = prolonged flattened curve   Voided volume = 202 mL--adequate,  so that study is considered abnormal; suspicious for obstruction    Description of procedure:   The complex uroflow study today was performed using uroflow curve = prolonged flattened curve;  study is considered abnormal and suspicious for obstruction; Office Bladder Scan = PVR of 126 mL also supports presence of obstruction. I explain to patient he is not emptying his bladder completely.  04/26/2021 o observation.           I explained to patient the benefits, risks, complications, side effects, reasons for, nature of, alternatives to the above treatment options, and I answered questions concerning them; patient understands all of this and decides to pr

## 2021-04-26 NOTE — PATIENT INSTRUCTIONS
Rene Kramer M.D.    1.  For burning pain with urination after today cystoscopy, please take either prescription phenazopyridine 200 mg capsule 3 times daily with food OR over-the-counter \"Azo\" 95 mg capsule, 1 to

## 2021-04-30 ENCOUNTER — PATIENT MESSAGE (OUTPATIENT)
Dept: SURGERY | Facility: CLINIC | Age: 56
End: 2021-04-30

## 2021-04-30 NOTE — TELEPHONE ENCOUNTER
From: Aurora Fernandez  To: Germaine Gutierrez MD  Sent: 4/30/2021 10:26 AM CDT  Subject: Test Results Question    Dr. Harpal Simon. Will you please put up the narrative results from my Cystoscopy into My Chart.     Thanks,    Aurora Rios

## 2021-05-04 ENCOUNTER — TELEPHONE (OUTPATIENT)
Dept: GASTROENTEROLOGY | Facility: CLINIC | Age: 56
End: 2021-05-04

## 2021-05-04 NOTE — TELEPHONE ENCOUNTER
----- Message from Vaughn Contreras RN sent at 2019 12:51 PM CDT -----  Regardin yr CLN recall  Entered into Epic:Recall colon in 2 years per Dr. Yvonne Rose. Last Colon done 19, next due 21. HM updated.

## 2021-05-11 ENCOUNTER — HOSPITAL ENCOUNTER (OUTPATIENT)
Dept: ULTRASOUND IMAGING | Facility: HOSPITAL | Age: 56
Discharge: HOME | End: 2021-05-11
Attending: UROLOGY
Payer: COMMERCIAL

## 2021-05-11 DIAGNOSIS — N40.1 BENIGN PROSTATIC HYPERPLASIA WITH URINARY FREQUENCY: ICD-10-CM

## 2021-05-11 DIAGNOSIS — R35.0 BENIGN PROSTATIC HYPERPLASIA WITH URINARY FREQUENCY: ICD-10-CM

## 2021-05-11 PROCEDURE — 76872 US TRANSRECTAL: CPT | Performed by: UROLOGY

## 2021-05-16 NOTE — PROGRESS NOTES
Shanna Bates,  5/11/2021 prostate ultrasound measures prostate volume to be 26 cc which is only slightly or mildly enlarged; this is in contrast your office cystoscopy with a flexible cystoscope under local anesthesia which cystoscopically revealed moderate obstr

## 2021-05-23 ENCOUNTER — TELEPHONE (OUTPATIENT)
Dept: SURGERY | Facility: CLINIC | Age: 56
End: 2021-05-23

## 2021-05-23 NOTE — TELEPHONE ENCOUNTER
I called and spoke to patient by phone today concerning possibility of patient undergoing UroLift operation, by a urologist who has performed many of them.   Immediately afterwards, I sent patient the following message by means of \"my chart\" =      Savannah Talbot, them at [facility], I would recommend Dr. Gabino Villatoro. He is EXCELLENT!!!  Dr. Marli Santa is (1) of (12) Tamika Lux (ESEQUIEL) in the [facility] and has performed over 280 cases.   UroLift ESEQUIEL’s must demonstrate clinical pro

## 2021-06-22 RX ORDER — PANTOPRAZOLE SODIUM 40 MG/1
40 TABLET, DELAYED RELEASE ORAL
Qty: 90 TABLET | Refills: 0 | Status: SHIPPED | OUTPATIENT
Start: 2021-06-22 | End: 2021-08-30

## 2021-06-22 NOTE — TELEPHONE ENCOUNTER
Requested Prescriptions     Pending Prescriptions Disp Refills   • PANTOPRAZOLE SODIUM 40 MG Oral Tab EC [Pharmacy Med Name: PANTOPRAZOLE SOD 40MG EC TABLET] 90 tablet 3     Sig: TAKE 1 TABLET BY MOUTH IN  THE MORNING BEFORE  BREAKFAST     LOV: 08/11/2020

## 2021-08-30 RX ORDER — PANTOPRAZOLE SODIUM 40 MG/1
40 TABLET, DELAYED RELEASE ORAL
Qty: 90 TABLET | Refills: 0 | Status: SHIPPED | OUTPATIENT
Start: 2021-08-30 | End: 2021-11-18

## 2021-08-30 NOTE — TELEPHONE ENCOUNTER
May schedule a colonoscopy for a history of polyps following a split dose MiraLAX/Gatorade preparation and monitored anesthesia care.

## 2021-08-30 NOTE — TELEPHONE ENCOUNTER
Please contact Erma Rae. I have refilled the medication for 3 months. He is overdue for a surveillance colonoscopy or an office visit.   He should schedule either for further refills

## 2021-08-30 NOTE — TELEPHONE ENCOUNTER
Requested Prescriptions     Pending Prescriptions Disp Refills   • PANTOPRAZOLE 40 MG Oral Tab EC [Pharmacy Med Name: Pantoprazole Sodium 40 MG Oral Tablet Delayed Release] 90 tablet 3     Sig: TAKE 1 TABLET BY MOUTH  BEFORE BREAKFAST     LOV: 08/11/2020

## 2021-08-31 ENCOUNTER — TELEPHONE (OUTPATIENT)
Dept: GASTROENTEROLOGY | Facility: CLINIC | Age: 56
End: 2021-08-31

## 2021-08-31 DIAGNOSIS — Z86.010 HX OF COLONIC POLYPS: Primary | ICD-10-CM

## 2021-08-31 NOTE — TELEPHONE ENCOUNTER
Gadiel Gomez, RN    3:34 PM  Note     Schedulers-     Please assist in scheduling patient for a colonoscopy per Dr. Dario De Los Santos' orders as seen in message below. Thank you!       August 30, 2021  Kathy Soni MD  to [facility] Kiran Summers Staff        4:4

## 2021-08-31 NOTE — TELEPHONE ENCOUNTER
Schedulers-    Please assist in scheduling patient for a colonoscopy per Dr. Shyla Campbell' orders as seen in message below. Thank you!

## 2021-08-31 NOTE — TELEPHONE ENCOUNTER
GI Schedulers,     Please assist with scheduling colonoscopy with Dr. Javier Olivier orders attached below. Orders granted in refill request 08/29/2021. Thank you!

## 2021-10-06 NOTE — TELEPHONE ENCOUNTER
I contacted patient, verified allergies and medications, he denied any changes. He agreed to [employer].      Scheduled for: Colonoscopy [address]   Provider Name: Dr Andi De La Torre   Date: Fri 2/04/2022   Location: [facility]    Sedation: MAC    Time: 1 pm,

## 2021-11-18 RX ORDER — PANTOPRAZOLE SODIUM 40 MG/1
TABLET, DELAYED RELEASE ORAL
Qty: 90 TABLET | Refills: 3 | Status: SHIPPED | OUTPATIENT
Start: 2021-11-18

## 2022-01-25 ENCOUNTER — ORDER TRANSCRIPTION (OUTPATIENT)
Dept: SLEEP CENTER | Age: 57
End: 2022-01-25

## 2022-01-25 DIAGNOSIS — I48.0 PAROXYSMAL ATRIAL FIBRILLATION (HCC): ICD-10-CM

## 2022-01-25 DIAGNOSIS — R00.2 PALPITATIONS: Primary | ICD-10-CM

## 2022-01-25 DIAGNOSIS — R55 PRE-SYNCOPE: ICD-10-CM

## 2022-01-28 ENCOUNTER — TELEPHONE (OUTPATIENT)
Dept: GASTROENTEROLOGY | Facility: CLINIC | Age: 57
End: 2022-01-28

## 2022-01-28 NOTE — TELEPHONE ENCOUNTER
Pt has CLN scheduled for 2/4/22 and calling for prep instructions.  Please call [phone] or sent instructions via [employer]

## 2022-02-04 ENCOUNTER — SURGERY CENTER DOCUMENTATION (OUTPATIENT)
Dept: SURGERY | Age: 57
End: 2022-02-04

## 2022-02-04 PROCEDURE — 45385 COLONOSCOPY W/LESION REMOVAL: CPT | Performed by: INTERNAL MEDICINE

## 2022-02-04 NOTE — PROCEDURES
[address] [facility] Report      Date of Procedure:  02/04/22      Preoperative Diagnosis:  Personal history of adenomatous colon polyps      Postoperative Diagnosis:  1. Colon polyps  2. Sigmoid colon diverticulosis      Procedure:    Colonoscopy with polypectomy      Surgeon:  Moises Benavides M.D. Anesthesia:  Monitored anesthesia care  Cecal withdrawal time: 24 minutes  EBL:  Insignificant      Brief History: This is a 60year old male who presents for a surveillance colonoscopy in the setting of a history of multiple adenomatous colon polyps. The patient's last colonoscopy was 2 1/2 years prior. He has had no new lower gastrointestinal tract symptoms or signs. Technique:  After informed consent, the patient was placed in the left lateral recumbent position. Digital rectal examination revealed no palpable intraluminal abnormalities. An Olympus variable stiffness 190 series HD pediatric (required previously) colonoscope was inserted into the rectum and advanced under direct vision by following the lumen to the terminal ileum. The colon was examined upon withdrawal in the left lateral recumbent position. Findings:  The preparation of the colon was good. The terminal ileum was examined for 5 cm and visually normal.  The ileocecal valve was well preserved. The visualized colonic mucosa from the cecum to the anal verge was normal with an intact vascular pattern. There were #3 polyps seen within the colon which removed as follows:    1. In the cecum there was a 2-3 mm sessile polyp which was cold snare excised and retrieved. 2.  In the ascending colon there was a 4 mm sessile polyp which was cold snare excised and retrieved. 3.  In the sigmoid colon there was a 4-5 mm sessile polyp which was cold snare excised and retrieved. Inspection of all sites revealed no evidence of ongoing bleeding.   There was an irregular but soft appearance to the mucosa surrounding the appendiceal orifice. This was biopsied. There were a few diverticula seen in the sigmoid colon without current signs of complication. There were no other colonic polyps, mass lesions, vascular anomalies or signs of inflammation seen. Retroflexion in the rectum revealed no abnormalities. The procedure was well tolerated without immediate complication. Impression:  1. Colon polyps  2. Cecal mucosal findings as described above of uncertain significance  3. Sigmoid colon diverticulosis, currently uncomplicated      Recommendations:  1. High-fiber diet. 2.  Follow-up biopsy results. Further recommendations pending biopsy.         Angie Tellez MD  2/4/2022

## 2022-02-10 ENCOUNTER — OFFICE VISIT (OUTPATIENT)
Dept: SLEEP CENTER | Age: 57
End: 2022-02-10
Attending: INTERNAL MEDICINE
Payer: COMMERCIAL

## 2022-02-10 DIAGNOSIS — R00.2 PALPITATIONS: ICD-10-CM

## 2022-02-10 DIAGNOSIS — I48.0 PAROXYSMAL ATRIAL FIBRILLATION (HCC): ICD-10-CM

## 2022-02-10 DIAGNOSIS — R55 PRE-SYNCOPE: ICD-10-CM

## 2022-02-10 PROCEDURE — 95806 SLEEP STUDY UNATT&RESP EFFT: CPT

## 2022-02-12 NOTE — PROCEDURES
[facility]  Accredited by the [facility] ([facility])    PATIENT'S NAME: JESÚS BARBER   ATTENDING PHYSICIAN: Sierra Koroma MD   REFERRING PHYSICIAN: Sierra Koroma MD   PATIENT ACCOUNT #: [id number] LOCATION: [facility] #: [id number] YOB: 1965   DATE OF STUDY: 02/10/2022       SLEEP STUDY REPORT    STUDY TYPE:  Home sleep test.    INDICATION:  Suspected obstructive sleep apnea (ICD-10 code G47.33) in a patient with paroxysmal atrial fibrillation, early morning wakening, snoring, unrefreshing sleep, an Violet Sleepiness Scale score 7/24, and body mass index 26.3. RESULTS:  The patient underwent home sleep test with measurement of his nasal airflow, nasal air pressure, snoring, chest and abdominal wall motion, oximetry, and body position. I have reviewed the entirety of the raw data of this study. During the study, the total recording time is 539 minutes. The lights-out clock time is 9:47 p.m., lights-on clock time is 6:46 a.m. There were 20 apneic events of which 18 were obstructive and 2 were central for an apnea index of 2.2 events per hour. There were 104 hypopneic events for a hypopnea index of 11.6 events per hour. The combined apnea plus hypopnea index is 13.8 events per hour. There was no significant hypoventilation, Cheyne-Cabrera breathing, or periodic breathing. The average oxygen saturation is 94%, the lowest oxygen saturation is 85%, and the average desaturation is 4%. The oxygen desaturation index is 14.2 events per hour. Snoring was appreciated. The patient spent 526 minutes in the supine position, equivalent to 98% of the testing, and 12 minutes in the non-supine position, equivalent to 2% of the testing. The average heart rate is 53 beats per minute. INTERPRETATION:  The data generated from this study is consistent with mild obstructive sleep apnea with 14 respiratory events per hour. RECOMMENDATIONS:    1. CPAP titration. 2.   Avoid alcohol. 3.   Avoid sedating drug. 4.   Patient should not drive if at all sleepy. Please do not hesitate to contact me if there is any question whatsoever regarding interpretation of this study.     Dictated By Luann Gannon MD  d: 02/11/2022 17:33:22  t: 02/11/2022 18:22:27  Job [id number]  PJC/    cc: Diana Lamas MD

## 2022-02-24 ENCOUNTER — TELEPHONE (OUTPATIENT)
Dept: GASTROENTEROLOGY | Facility: CLINIC | Age: 57
End: 2022-02-24

## 2022-02-24 NOTE — TELEPHONE ENCOUNTER
GI RNs: Can we obtain a copy of the patient's [facility] pathology report from [address] [place] [address]?

## 2022-03-09 NOTE — TELEPHONE ENCOUNTER
Please contact [employer]. Is there any way that I can speak to Dr. Moriah Stratton regarding the findings of the cecal biopsy showing a tubular adenoma. If not my question would be how many of the biopsy fragments showed the adenomatous changes?

## 2022-03-10 RX ORDER — SODIUM CHLORIDE, SODIUM LACTATE, POTASSIUM CHLORIDE, CALCIUM CHLORIDE 600; 310; 30; 20 MG/100ML; MG/100ML; MG/100ML; MG/100ML
INJECTION, SOLUTION INTRAVENOUS CONTINUOUS
Status: CANCELLED | OUTPATIENT
Start: 2022-03-10

## 2022-03-11 NOTE — TELEPHONE ENCOUNTER
I called [facility] client services at [phone]. Spoke with Kathie Valdivia, . Informed her pt name//specimen collection date. She confirmed the above and will provide pathology services with necessity to contact provider at [phone] to discuss patient's pathology report.

## 2022-03-14 ENCOUNTER — HOSPITAL ENCOUNTER (OUTPATIENT)
Dept: CT IMAGING | Facility: HOSPITAL | Age: 57
Discharge: HOME | End: 2022-03-14
Attending: INTERNAL MEDICINE
Payer: COMMERCIAL

## 2022-03-14 VITALS
SYSTOLIC BLOOD PRESSURE: 147 MMHG | DIASTOLIC BLOOD PRESSURE: 87 MMHG | HEART RATE: 58 BPM | WEIGHT: 175 LBS | BODY MASS INDEX: 25.92 KG/M2 | RESPIRATION RATE: 12 BRPM | HEIGHT: 69 IN

## 2022-03-14 DIAGNOSIS — I48.0 PAROXYSMAL ATRIAL FIBRILLATION (HCC): ICD-10-CM

## 2022-03-14 LAB — CREAT BLD-MCNC: 1.2 MG/DL

## 2022-03-14 PROCEDURE — 75574 CT ANGIO HRT W/3D IMAGE: CPT | Performed by: INTERNAL MEDICINE

## 2022-03-14 PROCEDURE — 82565 ASSAY OF CREATININE: CPT

## 2022-03-14 RX ORDER — DILTIAZEM HYDROCHLORIDE 5 MG/ML
5 INJECTION INTRAVENOUS SEE ADMIN INSTRUCTIONS
Status: DISCONTINUED | OUTPATIENT
Start: 2022-03-14 | End: 2022-03-16

## 2022-03-14 RX ORDER — METOPROLOL TARTRATE 5 MG/5ML
5 INJECTION INTRAVENOUS SEE ADMIN INSTRUCTIONS
Status: DISCONTINUED | OUTPATIENT
Start: 2022-03-14 | End: 2022-03-16

## 2022-03-14 RX ORDER — NITROGLYCERIN 0.4 MG/1
0.4 TABLET SUBLINGUAL ONCE
Status: COMPLETED | OUTPATIENT
Start: 2022-03-14 | End: 2022-03-14

## 2022-03-14 RX ADMIN — NITROGLYCERIN 0.4 MG: 0.4 TABLET SUBLINGUAL at 08:42:00

## 2022-03-14 NOTE — IMAGING NOTE
TO RAD HOLDING AT 0749      HX TAKEN :History of AFIB ON AND OFF    PT CONSENTED AT 0756     BASELINE VITAL SIGNS   HR 58   /87    CTA ORDERED BY DR Troy Kimbrough  WAS PT GIVEN CTA  PREMEDS NO HR LOW ALREADY     18 GAUGE IV STARTED AT 0819  POC TESTING COMPLETED GFR =66    CREATINE =1.2    TO CT TABLE @ 0841    O2 PLACED @ 2 LITERS NASAL CANNULA     CONNECT TO MONITOR  VS /75 HR 56       NITROGLYCERIN 0.4 MILLIGRAMS SUBLINGUAL GIVEN AT 0842     CALCIUM SCORE COMPLETED AT N/A     INJECTION STARTED AT 0845  HR 58 AND 60  DURING SCAN PROCEDURE COMPLETE     POST SCAN VS /71 HR 57  AT 0848    0900 PT TO HOLDING AREA     0908  VS /81 HR 49      AVS  PROVIDED       0913  DISCHARGED HOME

## 2022-03-18 ENCOUNTER — HOSPITAL ENCOUNTER (OUTPATIENT)
Dept: INTERVENTIONAL RADIOLOGY/VASCULAR | Facility: HOSPITAL | Age: 57
Discharge: HOME | End: 2022-03-18
Attending: INTERNAL MEDICINE | Admitting: INTERNAL MEDICINE
Payer: COMMERCIAL

## 2022-03-18 ENCOUNTER — ANESTHESIA (OUTPATIENT)
Dept: INTERVENTIONAL RADIOLOGY/VASCULAR | Facility: HOSPITAL | Age: 57
End: 2022-03-18
Payer: COMMERCIAL

## 2022-03-18 ENCOUNTER — HOSPITAL ENCOUNTER (OUTPATIENT)
Dept: CV DIAGNOSTICS | Facility: HOSPITAL | Age: 57
Discharge: HOME | End: 2022-03-18
Attending: INTERNAL MEDICINE
Payer: COMMERCIAL

## 2022-03-18 ENCOUNTER — HOSPITAL ENCOUNTER (OUTPATIENT)
Dept: INTERVENTIONAL RADIOLOGY/VASCULAR | Facility: HOSPITAL | Age: 57
Discharge: HOME | End: 2022-03-18
Attending: INTERNAL MEDICINE
Payer: COMMERCIAL

## 2022-03-18 VITALS
DIASTOLIC BLOOD PRESSURE: 84 MMHG | TEMPERATURE: 98 F | HEART RATE: 86 BPM | RESPIRATION RATE: 16 BRPM | OXYGEN SATURATION: 95 % | HEIGHT: 69 IN | WEIGHT: 177 LBS | BODY MASS INDEX: 26.22 KG/M2 | SYSTOLIC BLOOD PRESSURE: 162 MMHG

## 2022-03-18 VITALS
SYSTOLIC BLOOD PRESSURE: 109 MMHG | HEIGHT: 69 IN | DIASTOLIC BLOOD PRESSURE: 70 MMHG | OXYGEN SATURATION: 97 % | WEIGHT: 177 LBS | HEART RATE: 52 BPM | BODY MASS INDEX: 26.22 KG/M2 | RESPIRATION RATE: 15 BRPM

## 2022-03-18 DIAGNOSIS — I48.91 ATRIAL FIBRILLATION (HCC): ICD-10-CM

## 2022-03-18 DIAGNOSIS — Z01.818 PREOP TESTING: Primary | ICD-10-CM

## 2022-03-18 LAB
ISTAT ACTIVATED CLOTTING TIME: 315 SECONDS (ref 125–137)
ISTAT ACTIVATED CLOTTING TIME: 315 SECONDS (ref 125–137)
ISTAT ACTIVATED CLOTTING TIME: 327 SECONDS (ref 125–137)

## 2022-03-18 PROCEDURE — 99152 MOD SED SAME PHYS/QHP 5/>YRS: CPT

## 2022-03-18 PROCEDURE — 93325 DOPPLER ECHO COLOR FLOW MAPG: CPT | Performed by: INTERNAL MEDICINE

## 2022-03-18 PROCEDURE — B24BZZ4 ULTRASONOGRAPHY OF HEART WITH AORTA, TRANSESOPHAGEAL: ICD-10-PCS | Performed by: INTERNAL MEDICINE

## 2022-03-18 PROCEDURE — 3E033XZ INTRODUCTION OF VASOPRESSOR INTO PERIPHERAL VEIN, PERCUTANEOUS APPROACH: ICD-10-PCS | Performed by: INTERNAL MEDICINE

## 2022-03-18 PROCEDURE — 93623 PRGRMD STIMJ&PACG IV RX NFS: CPT

## 2022-03-18 PROCEDURE — B24CZZ4 ULTRASONOGRAPHY OF PERICARDIUM, TRANSESOPHAGEAL: ICD-10-PCS | Performed by: INTERNAL MEDICINE

## 2022-03-18 PROCEDURE — 85347 COAGULATION TIME ACTIVATED: CPT

## 2022-03-18 PROCEDURE — S0077 INJECTION, CLINDAMYCIN PHOSP: HCPCS

## 2022-03-18 PROCEDURE — 93320 DOPPLER ECHO COMPLETE: CPT | Performed by: INTERNAL MEDICINE

## 2022-03-18 PROCEDURE — 4A023FZ MEASUREMENT OF CARDIAC RHYTHM, PERCUTANEOUS APPROACH: ICD-10-PCS | Performed by: INTERNAL MEDICINE

## 2022-03-18 PROCEDURE — 4A02X9Z MEASUREMENT OF CARDIAC OUTPUT, EXTERNAL APPROACH: ICD-10-PCS | Performed by: INTERNAL MEDICINE

## 2022-03-18 PROCEDURE — 93312 ECHO TRANSESOPHAGEAL: CPT

## 2022-03-18 PROCEDURE — 02K83ZZ MAP CONDUCTION MECHANISM, PERCUTANEOUS APPROACH: ICD-10-PCS | Performed by: INTERNAL MEDICINE

## 2022-03-18 PROCEDURE — 93656 COMPRE EP EVAL ABLTJ ATR FIB: CPT

## 2022-03-18 PROCEDURE — 4A0234Z MEASUREMENT OF CARDIAC ELECTRICAL ACTIVITY, PERCUTANEOUS APPROACH: ICD-10-PCS | Performed by: INTERNAL MEDICINE

## 2022-03-18 PROCEDURE — 02583ZZ DESTRUCTION OF CONDUCTION MECHANISM, PERCUTANEOUS APPROACH: ICD-10-PCS | Performed by: INTERNAL MEDICINE

## 2022-03-18 PROCEDURE — 36415 COLL VENOUS BLD VENIPUNCTURE: CPT

## 2022-03-18 PROCEDURE — S0077 INJECTION, CLINDAMYCIN PHOSP: HCPCS | Performed by: ANESTHESIOLOGY

## 2022-03-18 RX ORDER — ISOPROTERENOL HYDROCHLORIDE 0.2 MG/ML
INJECTION, SOLUTION INTRAMUSCULAR; INTRAVENOUS
Status: COMPLETED
Start: 2022-03-18 | End: 2022-03-18

## 2022-03-18 RX ORDER — SODIUM CHLORIDE, SODIUM LACTATE, POTASSIUM CHLORIDE, CALCIUM CHLORIDE 600; 310; 30; 20 MG/100ML; MG/100ML; MG/100ML; MG/100ML
INJECTION, SOLUTION INTRAVENOUS CONTINUOUS PRN
Status: DISCONTINUED | OUTPATIENT
Start: 2022-03-18 | End: 2022-03-18 | Stop reason: SURG

## 2022-03-18 RX ORDER — MIDAZOLAM HYDROCHLORIDE 1 MG/ML
INJECTION INTRAMUSCULAR; INTRAVENOUS
Status: COMPLETED
Start: 2022-03-18 | End: 2022-03-18

## 2022-03-18 RX ORDER — EPHEDRINE SULFATE 50 MG/ML
INJECTION INTRAVENOUS AS NEEDED
Status: DISCONTINUED | OUTPATIENT
Start: 2022-03-18 | End: 2022-03-18 | Stop reason: SURG

## 2022-03-18 RX ORDER — HALOPERIDOL 5 MG/ML
0.25 INJECTION INTRAMUSCULAR ONCE AS NEEDED
Status: DISCONTINUED | OUTPATIENT
Start: 2022-03-18 | End: 2022-03-18 | Stop reason: HOSPADM

## 2022-03-18 RX ORDER — SODIUM CHLORIDE 9 MG/ML
INJECTION, SOLUTION INTRAVENOUS CONTINUOUS
Status: DISCONTINUED | OUTPATIENT
Start: 2022-03-18 | End: 2022-03-18

## 2022-03-18 RX ORDER — SODIUM CHLORIDE, SODIUM LACTATE, POTASSIUM CHLORIDE, CALCIUM CHLORIDE 600; 310; 30; 20 MG/100ML; MG/100ML; MG/100ML; MG/100ML
INJECTION, SOLUTION INTRAVENOUS CONTINUOUS
Status: DISCONTINUED | OUTPATIENT
Start: 2022-03-18 | End: 2022-03-18 | Stop reason: HOSPADM

## 2022-03-18 RX ORDER — LIDOCAINE HYDROCHLORIDE 20 MG/ML
INJECTION, SOLUTION EPIDURAL; INFILTRATION; INTRACAUDAL; PERINEURAL
Status: COMPLETED
Start: 2022-03-18 | End: 2022-03-18

## 2022-03-18 RX ORDER — PROTAMINE SULFATE 10 MG/ML
INJECTION, SOLUTION INTRAVENOUS AS NEEDED
Status: DISCONTINUED | OUTPATIENT
Start: 2022-03-18 | End: 2022-03-18 | Stop reason: SURG

## 2022-03-18 RX ORDER — LIDOCAINE HYDROCHLORIDE 20 MG/ML
5 SOLUTION OROPHARYNGEAL ONCE
Status: COMPLETED | OUTPATIENT
Start: 2022-03-18 | End: 2022-03-18

## 2022-03-18 RX ORDER — HYDROMORPHONE HYDROCHLORIDE 1 MG/ML
0.6 INJECTION, SOLUTION INTRAMUSCULAR; INTRAVENOUS; SUBCUTANEOUS EVERY 5 MIN PRN
Status: DISCONTINUED | OUTPATIENT
Start: 2022-03-18 | End: 2022-03-18 | Stop reason: HOSPADM

## 2022-03-18 RX ORDER — MORPHINE SULFATE 4 MG/ML
4 INJECTION, SOLUTION INTRAMUSCULAR; INTRAVENOUS EVERY 10 MIN PRN
Status: DISCONTINUED | OUTPATIENT
Start: 2022-03-18 | End: 2022-03-18 | Stop reason: HOSPADM

## 2022-03-18 RX ORDER — ROCURONIUM BROMIDE 10 MG/ML
INJECTION, SOLUTION INTRAVENOUS AS NEEDED
Status: DISCONTINUED | OUTPATIENT
Start: 2022-03-18 | End: 2022-03-18 | Stop reason: SURG

## 2022-03-18 RX ORDER — PROTAMINE SULFATE 10 MG/ML
INJECTION, SOLUTION INTRAVENOUS
Status: COMPLETED
Start: 2022-03-18 | End: 2022-03-18

## 2022-03-18 RX ORDER — GLYCOPYRROLATE 0.2 MG/ML
INJECTION, SOLUTION INTRAMUSCULAR; INTRAVENOUS AS NEEDED
Status: DISCONTINUED | OUTPATIENT
Start: 2022-03-18 | End: 2022-03-18 | Stop reason: SURG

## 2022-03-18 RX ORDER — ONDANSETRON 2 MG/ML
4 INJECTION INTRAMUSCULAR; INTRAVENOUS ONCE AS NEEDED
Status: DISCONTINUED | OUTPATIENT
Start: 2022-03-18 | End: 2022-03-18 | Stop reason: HOSPADM

## 2022-03-18 RX ORDER — MIDAZOLAM HYDROCHLORIDE 1 MG/ML
5 INJECTION INTRAMUSCULAR; INTRAVENOUS ONCE
Status: COMPLETED | OUTPATIENT
Start: 2022-03-18 | End: 2022-03-18

## 2022-03-18 RX ORDER — CLINDAMYCIN PHOSPHATE 150 MG/ML
INJECTION, SOLUTION INTRAVENOUS AS NEEDED
Status: DISCONTINUED | OUTPATIENT
Start: 2022-03-18 | End: 2022-03-18 | Stop reason: SURG

## 2022-03-18 RX ORDER — CLINDAMYCIN PHOSPHATE 600 MG/50ML
INJECTION INTRAVENOUS
Status: COMPLETED
Start: 2022-03-18 | End: 2022-03-18

## 2022-03-18 RX ORDER — HYDROCODONE BITARTRATE AND ACETAMINOPHEN 5; 325 MG/1; MG/1
2 TABLET ORAL AS NEEDED
Status: DISCONTINUED | OUTPATIENT
Start: 2022-03-18 | End: 2022-03-18 | Stop reason: HOSPADM

## 2022-03-18 RX ORDER — ONDANSETRON 2 MG/ML
INJECTION INTRAMUSCULAR; INTRAVENOUS AS NEEDED
Status: DISCONTINUED | OUTPATIENT
Start: 2022-03-18 | End: 2022-03-18 | Stop reason: SURG

## 2022-03-18 RX ORDER — NALOXONE HYDROCHLORIDE 0.4 MG/ML
80 INJECTION, SOLUTION INTRAMUSCULAR; INTRAVENOUS; SUBCUTANEOUS AS NEEDED
Status: DISCONTINUED | OUTPATIENT
Start: 2022-03-18 | End: 2022-03-18 | Stop reason: HOSPADM

## 2022-03-18 RX ORDER — MORPHINE SULFATE 2 MG/ML
2 INJECTION, SOLUTION INTRAMUSCULAR; INTRAVENOUS EVERY 10 MIN PRN
Status: DISCONTINUED | OUTPATIENT
Start: 2022-03-18 | End: 2022-03-18 | Stop reason: HOSPADM

## 2022-03-18 RX ORDER — HEPARIN SODIUM 1000 [USP'U]/ML
INJECTION, SOLUTION INTRAVENOUS; SUBCUTANEOUS AS NEEDED
Status: DISCONTINUED | OUTPATIENT
Start: 2022-03-18 | End: 2022-03-18 | Stop reason: SURG

## 2022-03-18 RX ORDER — HEPARIN SODIUM 1000 [USP'U]/ML
INJECTION, SOLUTION INTRAVENOUS; SUBCUTANEOUS
Status: COMPLETED
Start: 2022-03-18 | End: 2022-03-18

## 2022-03-18 RX ORDER — NEOSTIGMINE METHYLSULFATE 1 MG/ML
INJECTION INTRAVENOUS AS NEEDED
Status: DISCONTINUED | OUTPATIENT
Start: 2022-03-18 | End: 2022-03-18 | Stop reason: SURG

## 2022-03-18 RX ORDER — MIDAZOLAM HYDROCHLORIDE 1 MG/ML
INJECTION INTRAMUSCULAR; INTRAVENOUS
Status: DISCONTINUED
Start: 2022-03-18 | End: 2022-03-18 | Stop reason: WASHOUT

## 2022-03-18 RX ORDER — LIDOCAINE HYDROCHLORIDE 20 MG/ML
SOLUTION OROPHARYNGEAL
Status: COMPLETED
Start: 2022-03-18 | End: 2022-03-18

## 2022-03-18 RX ORDER — HYDROMORPHONE HYDROCHLORIDE 1 MG/ML
0.2 INJECTION, SOLUTION INTRAMUSCULAR; INTRAVENOUS; SUBCUTANEOUS EVERY 5 MIN PRN
Status: DISCONTINUED | OUTPATIENT
Start: 2022-03-18 | End: 2022-03-18 | Stop reason: HOSPADM

## 2022-03-18 RX ORDER — MORPHINE SULFATE 10 MG/ML
6 INJECTION, SOLUTION INTRAMUSCULAR; INTRAVENOUS EVERY 10 MIN PRN
Status: DISCONTINUED | OUTPATIENT
Start: 2022-03-18 | End: 2022-03-18 | Stop reason: HOSPADM

## 2022-03-18 RX ORDER — DEXAMETHASONE SODIUM PHOSPHATE 4 MG/ML
VIAL (ML) INJECTION AS NEEDED
Status: DISCONTINUED | OUTPATIENT
Start: 2022-03-18 | End: 2022-03-18 | Stop reason: SURG

## 2022-03-18 RX ORDER — HYDROCODONE BITARTRATE AND ACETAMINOPHEN 5; 325 MG/1; MG/1
1 TABLET ORAL AS NEEDED
Status: DISCONTINUED | OUTPATIENT
Start: 2022-03-18 | End: 2022-03-18 | Stop reason: HOSPADM

## 2022-03-18 RX ORDER — HYDROMORPHONE HYDROCHLORIDE 1 MG/ML
0.4 INJECTION, SOLUTION INTRAMUSCULAR; INTRAVENOUS; SUBCUTANEOUS EVERY 5 MIN PRN
Status: DISCONTINUED | OUTPATIENT
Start: 2022-03-18 | End: 2022-03-18 | Stop reason: HOSPADM

## 2022-03-18 RX ORDER — LIDOCAINE HYDROCHLORIDE 10 MG/ML
INJECTION, SOLUTION EPIDURAL; INFILTRATION; INTRACAUDAL; PERINEURAL AS NEEDED
Status: DISCONTINUED | OUTPATIENT
Start: 2022-03-18 | End: 2022-03-18 | Stop reason: SURG

## 2022-03-18 RX ADMIN — ONDANSETRON 4 MG: 2 INJECTION INTRAMUSCULAR; INTRAVENOUS at 10:46:00

## 2022-03-18 RX ADMIN — SODIUM CHLORIDE: 9 INJECTION, SOLUTION INTRAVENOUS at 06:45:00

## 2022-03-18 RX ADMIN — LIDOCAINE HYDROCHLORIDE 50 MG: 10 INJECTION, SOLUTION EPIDURAL; INFILTRATION; INTRACAUDAL; PERINEURAL at 09:11:00

## 2022-03-18 RX ADMIN — SODIUM CHLORIDE, SODIUM LACTATE, POTASSIUM CHLORIDE, CALCIUM CHLORIDE: 600; 310; 30; 20 INJECTION, SOLUTION INTRAVENOUS at 10:26:00

## 2022-03-18 RX ADMIN — SODIUM CHLORIDE, SODIUM LACTATE, POTASSIUM CHLORIDE, CALCIUM CHLORIDE: 600; 310; 30; 20 INJECTION, SOLUTION INTRAVENOUS at 09:52:00

## 2022-03-18 RX ADMIN — PROTAMINE SULFATE 100 MG: 10 INJECTION, SOLUTION INTRAVENOUS at 10:45:00

## 2022-03-18 RX ADMIN — EPHEDRINE SULFATE 5 MG: 50 INJECTION INTRAVENOUS at 10:21:00

## 2022-03-18 RX ADMIN — CLINDAMYCIN PHOSPHATE 600 MG: 150 INJECTION, SOLUTION INTRAVENOUS at 09:20:00

## 2022-03-18 RX ADMIN — HEPARIN SODIUM 2000 UNITS: 1000 INJECTION, SOLUTION INTRAVENOUS; SUBCUTANEOUS at 10:10:00

## 2022-03-18 RX ADMIN — MIDAZOLAM HYDROCHLORIDE 5 MG: 1 INJECTION INTRAMUSCULAR; INTRAVENOUS at 08:30:00

## 2022-03-18 RX ADMIN — EPHEDRINE SULFATE 10 MG: 50 INJECTION INTRAVENOUS at 09:41:00

## 2022-03-18 RX ADMIN — HEPARIN SODIUM 5000 UNITS: 1000 INJECTION, SOLUTION INTRAVENOUS; SUBCUTANEOUS at 09:45:00

## 2022-03-18 RX ADMIN — LIDOCAINE HYDROCHLORIDE 5 ML: 20 SOLUTION OROPHARYNGEAL at 08:30:00

## 2022-03-18 RX ADMIN — EPHEDRINE SULFATE 10 MG: 50 INJECTION INTRAVENOUS at 09:27:00

## 2022-03-18 RX ADMIN — EPHEDRINE SULFATE 5 MG: 50 INJECTION INTRAVENOUS at 10:00:00

## 2022-03-18 RX ADMIN — SODIUM CHLORIDE, SODIUM LACTATE, POTASSIUM CHLORIDE, CALCIUM CHLORIDE: 600; 310; 30; 20 INJECTION, SOLUTION INTRAVENOUS at 09:27:00

## 2022-03-18 RX ADMIN — NEOSTIGMINE METHYLSULFATE 4 MG: 1 INJECTION INTRAVENOUS at 10:55:00

## 2022-03-18 RX ADMIN — HEPARIN SODIUM 10000 UNITS: 1000 INJECTION, SOLUTION INTRAVENOUS; SUBCUTANEOUS at 09:51:00

## 2022-03-18 RX ADMIN — ROCURONIUM BROMIDE 30 MG: 10 INJECTION, SOLUTION INTRAVENOUS at 09:11:00

## 2022-03-18 RX ADMIN — GLYCOPYRROLATE 0.8 MG: 0.2 INJECTION, SOLUTION INTRAMUSCULAR; INTRAVENOUS at 10:55:00

## 2022-03-18 RX ADMIN — DEXAMETHASONE SODIUM PHOSPHATE 8 MG: 4 MG/ML VIAL (ML) INJECTION at 09:29:00

## 2022-03-18 RX ADMIN — SODIUM CHLORIDE, SODIUM LACTATE, POTASSIUM CHLORIDE, CALCIUM CHLORIDE: 600; 310; 30; 20 INJECTION, SOLUTION INTRAVENOUS at 11:00:00

## 2022-03-18 RX ADMIN — SODIUM CHLORIDE, SODIUM LACTATE, POTASSIUM CHLORIDE, CALCIUM CHLORIDE: 600; 310; 30; 20 INJECTION, SOLUTION INTRAVENOUS at 09:06:00

## 2022-03-18 NOTE — IVS NOTE
Patient awake and alert x 4    Instructions given to patient and his wife, they stated understanding    VSS, ANGULO    Right groin soft, no hematoma    Right groin dressing dry, intact, no drainage seen    Patient able to drink, ambulate and void before discharge

## 2022-03-18 NOTE — ANESTHESIA PROCEDURE NOTES
Airway  Date/Time: 3/18/2022 9:14 AM  Urgency: Elective      General Information and Staff    Patient location during procedure: OR  Anesthesiologist: Stacie Ortez DO  Performed: anesthesiologist     Indications and Patient Condition  Indications for airway management: anesthesia  Sedation level: deep  Preoxygenated: yes  Patient position: sniffing  Mask difficulty assessment: 1 - vent by mask    Final Airway Details  Final airway type: endotracheal airway      Successful airway: ETT  Cuffed: yes   Successful intubation technique: direct laryngoscopy  Endotracheal tube insertion site: oral  Blade: Colleen  Blade size: #3  ETT size (mm): 7.5    Cormack-Lehane Classification: grade I - full view of glottis  Placement verified by: chest auscultation and capnometry   Measured from: lips  ETT to lips (cm): 23  Number of attempts at approach: 1  Number of other approaches attempted: 0

## 2022-03-18 NOTE — ANESTHESIA POSTPROCEDURE EVALUATION
Patient: Namita Fernandez    Procedure Summary     Date: 03/18/22 Room / Location: [place] [address].    Anesthesia Start: 3631 Anesthesia Stop: 1120    Procedures:       EP JERRY      EP PULMONARY VEIN/A-FIB ABLATION Diagnosis:       Atrial fibrillation ([facility] [address]      Atrial fibrillation ([facility] [address]    Scheduled Providers: Tahmina Dumont MD; Kee Gottron, DO; Lluvia Kennedy MD Anesthesiologist: Kee Gottron, DO    Anesthesia Type: general ASA Status: 3          Anesthesia Type: general    Vitals Value Taken Time   /70 03/18/22 1122   Temp  03/18/22 1122   Pulse 89 03/18/22 1122   Resp 16 03/18/22 1122   SpO2 98 03/18/22 1122       [facility] AN Post Evaluation:   Patient Evaluated in PACU  Patient Participation: complete - patient participated  Level of Consciousness: awake  Pain Management: adequate  Airway Patency:patent  Dental exam unchanged from preop  Yes    Cardiovascular Status: acceptable  Respiratory Status: acceptable  Postoperative Hydration acceptable      SAVANA WHEELER DO  3/18/2022 11:22 AM

## 2022-03-18 NOTE — PROCEDURES
[facility]    Cardiac Electrophysiology Procedure Note      Sejal Fernandez Location:Cath Lab Suites   [facility] [id number] MRN [id number]   Admission Date 3/18/2022 Procedure Date 3/18/2022   Attending Physician Liam Malagon MD Procedure Physician Wade Flores MD       Preprocedure Diagnosis:   Atrial fibrillation, paroxysmal     Postprocedure Diagnosis:   Atrial fibrillation, paroxysmal     Procedures:   1) Electrophysiology study with IV drug infusion and arrhythmia induction  2) Left atrial recordings and stimulation via coronary sinus catheterization  3) Radiofrequency transseptal puncture  4) Intracardiac echocardiography  5) Laser balloon ablation of atrial fibrillation  6) Fluoroscopy   7) Vascade MVP venous closure device x2     :   Wade Flores M.D. Anesthesia:   Per anesthesiologist, see separate record. Access:  RFV - 15Fr, 10Fr     Estimated blood loss:   20 mL     Complications:   None apparent. Findings:   The patient was brought to the operating room in the postabsorptive and stable state after transesophageal echocardiography confirmed no intracardiac thrombus. A procedural pause was performed to confirm the patient's identity and the site and type of surgery. Anesthesia was administered. The groins were prepped and draped in a sterile fashion. 1% lidocaine was administered in the right groin. Using the modified Seldinger technique, 8Fr and 10Fr sheaths were advanced into the right femoral vein. Intravenous heparin was then administered in bolus and infusion forms to keep the ACT at 300-400 seconds. An intracardiac echocardiography catheter and decapolar catheter were used to map the right atrium, the region of the bundle of His, the base of the right ventricle, and the coronary sinus. An electrophysiology study was performed for arrhythmia induction on and off IV isoproterenol.   Sinus cycle length 800 ms,   ms, HV 54 ms, AVBCL 300 ms, decremental and without preexcitation, AVNERP 600/<220 ms, RAERP 600/220 ms, VABCL 350 ms with retrograde decremental and concentric activation. The radiofrequency transseptal catheter was advanced through the 8Fr transseptal sheath, and with fluoroscopic, intracardiac echocardiographic, and contrast dye guidance, transseptal puncture was performed with 1 radiofrequency delivery. Through the transseptal sheath, a pigtail coiled exchange wire was advanced into the left atrium, and over this the HeartLight sheath and dilator were advanced into the left atrium. After removal of the dilator and wire and careful aspiration, a continuous infusion of heparinized saline was connected to the system at a rate of 3 mL/min. The HeartLight laser balloon ablation catheter was advanced through the sheath into the left atrium. Laser balloon ablation was then performed, with visual guidance via the HeartLight endoscope. During ablation of the right pulmonary veins, the EP catheter was used to pace the phrenic nerve at the level of the superior vena cava and diaphragmatic stimulation was constantly monitored. Continuous esophageal temperature monitoring was performed with an esophageal temperature probe positioned adjacent to the ablation balloon, with largest temperature delta of + 0.5 degrees F. The circular mapping catheter was advanced through the sheath to identify pulmonary vein isolation. The LSPV, LIPV, RSPV, and RIPV including a superior branch that acted as a right middle PV were all isolated with circumferential ablation. In sinus rhythm, differential pacing was performed from the coronary sinus to confirm isolation. The intracardiac echocardiography catheter was advanced to the mid right ventricle, identifying no intracardiac thrombus or pericardial effusion. Catheters were removed. Intravenous protamine was administered, sheaths were removed, and hemostasis obtained with 2 Vascade MVP venous closure devices.   There were no hematomas. The patient was transferred to the PACU by the anesthesiologist in stable condition. RESULTS:   - EP study results: normal sinus node function, normal AV conduction, no accessory pathways  - Successful laser balloon ablation pulmonary vein isolation for ablation of atrial fibrillation  - Venous closure device x2     PLAN:   1) Anesthesia recovery  2) Bed rest for 2 hours with head and right leg flat. 3) Start Eliquis tonight at 8 PM.  Stop aspirin. 4) Activity/lifting restrictions for 7 days for vascular care. 5) Follow-up with me as scheduled. Constantin Strange M.D.    Cardiac Electrophysiology     3/18/2022   -----------------------------------------

## 2022-03-21 ENCOUNTER — APPOINTMENT (OUTPATIENT)
Dept: GENERAL RADIOLOGY | Facility: HOSPITAL | Age: 57
End: 2022-03-21
Payer: COMMERCIAL

## 2022-03-21 ENCOUNTER — HOSPITAL ENCOUNTER (EMERGENCY)
Facility: HOSPITAL | Age: 57
Discharge: HOME | End: 2022-03-21
Attending: EMERGENCY MEDICINE
Payer: COMMERCIAL

## 2022-03-21 ENCOUNTER — APPOINTMENT (OUTPATIENT)
Dept: ULTRASOUND IMAGING | Facility: HOSPITAL | Age: 57
End: 2022-03-21
Attending: EMERGENCY MEDICINE
Payer: COMMERCIAL

## 2022-03-21 VITALS
HEART RATE: 68 BPM | TEMPERATURE: 99 F | BODY MASS INDEX: 26 KG/M2 | DIASTOLIC BLOOD PRESSURE: 77 MMHG | SYSTOLIC BLOOD PRESSURE: 122 MMHG | WEIGHT: 175 LBS | RESPIRATION RATE: 17 BRPM | OXYGEN SATURATION: 99 %

## 2022-03-21 DIAGNOSIS — R07.89 CHEST PAIN, ATYPICAL: ICD-10-CM

## 2022-03-21 DIAGNOSIS — R10.31 RIGHT GROIN PAIN: ICD-10-CM

## 2022-03-21 DIAGNOSIS — N39.0 URINARY TRACT INFECTION WITHOUT HEMATURIA, SITE UNSPECIFIED: Primary | ICD-10-CM

## 2022-03-21 LAB
ANION GAP SERPL CALC-SCNC: 1 MMOL/L (ref 0–18)
BASOPHILS # BLD AUTO: 0.08 X10(3) UL (ref 0–0.2)
BASOPHILS NFR BLD AUTO: 0.5 %
BILIRUB UR QL: NEGATIVE
BUN BLD-MCNC: 22 MG/DL (ref 7–18)
BUN/CREAT SERPL: 15.6 (ref 10–20)
CALCIUM BLD-MCNC: 9.2 MG/DL (ref 8.5–10.1)
CHLORIDE SERPL-SCNC: 106 MMOL/L (ref 98–112)
CHOLEST SERPL-MCNC: 173 MG/DL (ref ?–200)
CO2 SERPL-SCNC: 28 MMOL/L (ref 21–32)
COLOR UR: YELLOW
CREAT BLD-MCNC: 1.41 MG/DL
D DIMER PPP FEU-MCNC: 0.38 UG/ML FEU (ref ?–0.59)
DEPRECATED RDW RBC AUTO: 39.7 FL (ref 35.1–46.3)
EOSINOPHIL # BLD AUTO: 0.1 X10(3) UL (ref 0–0.7)
EOSINOPHIL NFR BLD AUTO: 0.6 %
ERYTHROCYTE [DISTWIDTH] IN BLOOD BY AUTOMATED COUNT: 12.8 % (ref 11–15)
GLUCOSE BLD-MCNC: 96 MG/DL (ref 70–99)
GLUCOSE UR-MCNC: NEGATIVE MG/DL
HCT VFR BLD AUTO: 47.8 %
HDLC SERPL-MCNC: 57 MG/DL (ref 40–59)
HGB BLD-MCNC: 15 G/DL
IMM GRANULOCYTES # BLD AUTO: 0.11 X10(3) UL (ref 0–1)
IMM GRANULOCYTES NFR BLD: 0.7 %
KETONES UR-MCNC: NEGATIVE MG/DL
LDLC SERPL CALC-MCNC: 94 MG/DL (ref ?–100)
LYMPHOCYTES # BLD AUTO: 1.59 X10(3) UL (ref 1–4)
LYMPHOCYTES NFR BLD AUTO: 9.9 %
MCH RBC QN AUTO: 26.7 PG (ref 26–34)
MCHC RBC AUTO-ENTMCNC: 31.4 G/DL (ref 31–37)
MCV RBC AUTO: 85.2 FL
MONOCYTES # BLD AUTO: 1.5 X10(3) UL (ref 0.1–1)
MONOCYTES NFR BLD AUTO: 9.4 %
NEUTROPHILS # BLD AUTO: 12.65 X10 (3) UL (ref 1.5–7.7)
NEUTROPHILS # BLD AUTO: 12.65 X10(3) UL (ref 1.5–7.7)
NITRITE UR QL STRIP.AUTO: NEGATIVE
NONHDLC SERPL-MCNC: 116 MG/DL (ref ?–130)
OSMOLALITY SERPL CALC.SUM OF ELEC: 283 MOSM/KG (ref 275–295)
PH UR: 7 [PH] (ref 5–8)
PLATELET # BLD AUTO: 295 10(3)UL (ref 150–450)
POTASSIUM SERPL-SCNC: 3.9 MMOL/L (ref 3.5–5.1)
PROT UR-MCNC: NEGATIVE MG/DL
RBC # BLD AUTO: 5.61 X10(6)UL
RBC #/AREA URNS AUTO: >10 /HPF
SODIUM SERPL-SCNC: 135 MMOL/L (ref 136–145)
SP GR UR STRIP: 1.02 (ref 1–1.03)
TRIGL SERPL-MCNC: 126 MG/DL (ref 30–149)
TROPONIN I HIGH SENSITIVITY: 359 NG/L
TROPONIN I HIGH SENSITIVITY: 421 NG/L
UROBILINOGEN UR STRIP-ACNC: <2
VIT C UR-MCNC: NEGATIVE MG/DL
VLDLC SERPL CALC-MCNC: 21 MG/DL (ref 0–30)
WBC # BLD AUTO: 16 X10(3) UL (ref 4–11)
WBC #/AREA URNS AUTO: >50 /HPF
WBC CLUMPS UR QL AUTO: PRESENT /HPF

## 2022-03-21 PROCEDURE — 80048 BASIC METABOLIC PNL TOTAL CA: CPT | Performed by: EMERGENCY MEDICINE

## 2022-03-21 PROCEDURE — 87077 CULTURE AEROBIC IDENTIFY: CPT | Performed by: EMERGENCY MEDICINE

## 2022-03-21 PROCEDURE — 96365 THER/PROPH/DIAG IV INF INIT: CPT

## 2022-03-21 PROCEDURE — 80061 LIPID PANEL: CPT | Performed by: EMERGENCY MEDICINE

## 2022-03-21 PROCEDURE — 85379 FIBRIN DEGRADATION QUANT: CPT | Performed by: EMERGENCY MEDICINE

## 2022-03-21 PROCEDURE — 85025 COMPLETE CBC W/AUTO DIFF WBC: CPT | Performed by: EMERGENCY MEDICINE

## 2022-03-21 PROCEDURE — 71045 X-RAY EXAM CHEST 1 VIEW: CPT | Performed by: EMERGENCY MEDICINE

## 2022-03-21 PROCEDURE — 93005 ELECTROCARDIOGRAM TRACING: CPT

## 2022-03-21 PROCEDURE — 93010 ELECTROCARDIOGRAM REPORT: CPT | Performed by: EMERGENCY MEDICINE

## 2022-03-21 PROCEDURE — 87086 URINE CULTURE/COLONY COUNT: CPT | Performed by: EMERGENCY MEDICINE

## 2022-03-21 PROCEDURE — 99285 EMERGENCY DEPT VISIT HI MDM: CPT

## 2022-03-21 PROCEDURE — 81001 URINALYSIS AUTO W/SCOPE: CPT | Performed by: EMERGENCY MEDICINE

## 2022-03-21 PROCEDURE — 84484 ASSAY OF TROPONIN QUANT: CPT | Performed by: EMERGENCY MEDICINE

## 2022-03-21 PROCEDURE — 76882 US LMTD JT/FCL EVL NVASC XTR: CPT | Performed by: EMERGENCY MEDICINE

## 2022-03-21 PROCEDURE — 87186 SC STD MICRODIL/AGAR DIL: CPT | Performed by: EMERGENCY MEDICINE

## 2022-03-21 RX ORDER — ONDANSETRON 2 MG/ML
4 INJECTION INTRAMUSCULAR; INTRAVENOUS ONCE
Status: DISCONTINUED | OUTPATIENT
Start: 2022-03-21 | End: 2022-03-21

## 2022-03-21 RX ORDER — CIPROFLOXACIN 500 MG/1
500 TABLET, FILM COATED ORAL 2 TIMES DAILY
Qty: 14 TABLET | Refills: 0 | Status: SHIPPED | OUTPATIENT
Start: 2022-03-21 | End: 2022-03-28

## 2022-03-21 RX ORDER — ONDANSETRON 4 MG/1
4 TABLET, ORALLY DISINTEGRATING ORAL ONCE
Status: COMPLETED | OUTPATIENT
Start: 2022-03-21 | End: 2022-03-21

## 2022-03-21 RX ORDER — CIPROFLOXACIN 2 MG/ML
400 INJECTION, SOLUTION INTRAVENOUS ONCE
Status: COMPLETED | OUTPATIENT
Start: 2022-03-21 | End: 2022-03-21

## 2022-03-21 NOTE — ED QUICK NOTES
Understands followup and AVS  Scripts discussed  Will return for new or concerning symptoms and follow up with MD  Ambulatory with steady gait

## 2022-03-21 NOTE — ED INITIAL ASSESSMENT (HPI)
Patient had ablation done here Friday. Patient states that CP started last night and that the surgical site is off looking and that his urine smells.

## 2022-04-28 ENCOUNTER — TELEPHONE (OUTPATIENT)
Dept: GASTROENTEROLOGY | Facility: CLINIC | Age: 57
End: 2022-04-28

## 2022-05-31 NOTE — TELEPHONE ENCOUNTER
GI RNs: Please send dictated lab letter to the patient's home and enter colonoscopy recall for March 2023.

## 2022-05-31 NOTE — TELEPHONE ENCOUNTER
Recall colon in March 2023 per Dr. Pb Mckenna. Last done: 2-4-22  Next due:3-4-23     Updated health maintenance and pt outreach. Dictated letter sent at this time.

## 2022-10-24 RX ORDER — PANTOPRAZOLE SODIUM 40 MG/1
40 TABLET, DELAYED RELEASE ORAL
Qty: 90 TABLET | Refills: 3 | Status: SHIPPED | OUTPATIENT
Start: 2022-10-24

## 2022-12-09 ENCOUNTER — TELEPHONE (OUTPATIENT)
Facility: CLINIC | Age: 57
End: 2022-12-09

## 2022-12-09 NOTE — TELEPHONE ENCOUNTER
Patient outreach message received:    Recall colon in March 2023 per Dr. Leonard Walsh. Last done: 2-4-22  Next due:3-4-23    Recall reminder letter mailed out to patient.

## 2022-12-14 ENCOUNTER — TELEPHONE (OUTPATIENT)
Facility: CLINIC | Age: 57
End: 2022-12-14

## 2022-12-14 DIAGNOSIS — Z86.010 PERSONAL HISTORY OF COLONIC POLYPS: Primary | ICD-10-CM

## 2022-12-14 NOTE — TELEPHONE ENCOUNTER
Patient received recall letter to schedule yearly colonoscopy informed of the 3-4 week turn around time. Please call at [phone].

## 2022-12-15 ENCOUNTER — PATIENT MESSAGE (OUTPATIENT)
Facility: CLINIC | Age: 57
End: 2022-12-15

## 2022-12-15 NOTE — TELEPHONE ENCOUNTER
From: Ludin Fernandez  To: Yisel Garza MD  Sent: 12/15/2022 1:01 PM CST  Subject: Trying to make an appointment     I received a letter stating I need schedule my now yearly colonoscopy as we have previously discussed. I called. I was told because scheduling was so far behind nobody would call me back for 4 - 6 weeks. ok.     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 Thats why I called in to schedule. Called back today, got the same runaround, nobody available to schedule. Not happy. Expressed that.    ... please just go ahead and schedule my colonoscopy for any time after March 5th or during April of 2023 if you can. Mondays or Fridays are best. I will make that work. If not, please have your actual schedulers, not the kids who answer the phones, reach out to me on QRuso so we can get it figured out. Thank you and I appreciate your help. Best regards and Happy Holidays. See you in scrubs.     Audelia Fernandez

## 2022-12-15 NOTE — TELEPHONE ENCOUNTER
Patient is returning call. Was extremely rude on the phone and started to use foul language about not having time for a call and disconnected the call.

## 2022-12-16 NOTE — TELEPHONE ENCOUNTER
Can we schedule the patient through [address] in April on a Monday or Friday as requested. Perhaps a Friday at [facility]. Split dose MiraLAX/Gatorade preparation and monitored anesthesia care for history of polyps.

## 2022-12-16 NOTE — TELEPHONE ENCOUNTER
Surgery schedulers- please contact patient and schedule per Dr. Artemio Gotti orders below. Thank you.

## 2022-12-29 RX ORDER — ROSUVASTATIN CALCIUM 10 MG/1
10 TABLET, COATED ORAL DAILY
COMMUNITY
Start: 2022-11-23

## 2022-12-29 NOTE — TELEPHONE ENCOUNTER
I contacted patient, he agreed to MyChart instructions. Patient denied taking any blood thinners or BP meds    Scheduled for: Colonoscopy 22[address]   Provider Name: Dr Leonard Walsh  Date: Fri 3/17/2023   Location: [facility]   Sedation: MAC   Time: 7:30 am, (pt is aware that [facility] will call the day before to confirm arrival time)  Prep: split miralax/gatoradre   Meds/Allergies Reconciled?: reviewed by provider   Diagnosis with codes: HCP Z86.010   Was patient informed to call insurance with codes (Y/N): Yes, [facility]  Referral sent?:  Yes  [facility] or [address] [place] [address] notified?:  Electronic case request was sent to [facility] via [employer]. Medication Orders: Pt is aware to NOT take iron pills, herbal meds and diet supplements for 7 days before exam. Also to NOT take any form of alcohol, recreational drugs and any forms of ED meds 24 hours before exam.      Misc Orders:       Further instructions given by staff:   Instructions given and pt verbalized understanding  MyChart instructions sent

## 2023-03-14 ENCOUNTER — PATIENT MESSAGE (OUTPATIENT)
Facility: CLINIC | Age: 58
End: 2023-03-14

## 2023-03-15 NOTE — TELEPHONE ENCOUNTER
From: Arminda Morris Less  To: Jluis Becker MD  Sent: 3/14/2023 8:25 PM CDT  Subject: Trying, yet again, to make contact    I received a random phone call from the hospital asking medical questions. AGAIN, I WORK AND CANNOT JUST ANSWER PHONES AT RANDOM TIMES!!!!  I called back and left amesage at 2:20 pm Tuesday. No reply. I will try again tomorrow but this system is so flawed. Please do not cancel my procedure. It is very upsetting that you only have one (1) person I can talk to and she is obviously busy.   Soraida Puente  [phone]

## 2023-03-15 NOTE — TELEPHONE ENCOUNTER
Called and spoke to the patient. He stated, he received a call from the surgery center. He has no questions at present.

## 2023-03-17 ENCOUNTER — SURGERY CENTER DOCUMENTATION (OUTPATIENT)
Dept: SURGERY | Age: 58
End: 2023-03-17

## 2023-03-17 NOTE — PROCEDURES
[address] Endoscopy Report      Date of Procedure:  03/17/23      Preoperative Diagnosis:  1. Colorectal cancer screening  2. Personal history of adenomatous colon polyps      Postoperative Diagnosis:  1. Colon polyps  2. Sigmoid colon diverticulosis      Procedure:    Colonoscopy with polypectomy      Surgeon:  Reg Sanchez M.D. Anesthesia:  Monitored anesthesia care  Cecal withdrawal time: 21 minutes  EBL:  Insignificant      Brief History: This is a 59year old male who presents for a surveillance colonoscopy in the setting of a personal history of adenomatous colon polyps including a recalcitrant cecal polyp near the appendiceal orifice. The patient had an episode of abdominal pain clinically diagnosis diverticulitis in January. He was treated with antibiotics for 5 days. A longer course could not be tolerated due to tendinitis. The patient has had intermittent mild left lower quadrant abdominal discomfort since that time without steady pain or fever. Technique:  After informed consent, the patient was placed in the left lateral recumbent position. Digital rectal examination revealed no palpable intraluminal abnormalities. An Olympus variable stiffness 190 series HD pediatric (required previously) colonoscope was inserted into the rectum and advanced under direct vision by following the lumen to the terminal ileum. The colon was examined upon withdrawal in the left lateral recumbent position. Findings:  The preparation of the colon was good. The terminal ileum was examined for 5 cm and visually normal.  The ileocecal valve was well preserved. The visualized colonic mucosa from the cecum to the anal verge was normal with an intact vascular pattern. In the base of the cecum arising near but not from the appendiceal orifice there was a 7-8 mm serrated sessile polyp. This was easily cold snare excised and retrieved.   Multiple additional biopsies were taken from around the appendiceal area and placed in a separate container labeled \"cecal biopsy\". There was an additional 5 mm sessile polyp in the proximal descending colon which was cold snare excised and retrieved. No ongoing bleeding from either site. There were at least a few diverticula seen in the sigmoid colon without current signs of diverticulitis. There were no other colonic polyps, mass lesions, vascular anomalies or signs of inflammation seen. Retroflexion in the rectum revealed internal hemorrhoids. The procedure was well tolerated without immediate complication. Impression:  1. Colon polyps  2. Sigmoid colon diverticulosis, currently uncomplicated    Recommendations:  1. High-fiber diet. 2.  Follow-up biopsy results. 3.  Recommendations regarding surveillance pending review of biopsies.         Catalina Belle MD  3/17/2023

## 2023-03-24 ENCOUNTER — TELEPHONE (OUTPATIENT)
Facility: CLINIC | Age: 58
End: 2023-03-24

## 2023-03-24 NOTE — TELEPHONE ENCOUNTER
Can you contact [facility] next week. I would like to speak to the pathologist about the results. My question would be for the cecal biopsies that show a sessile serrated adenoma. How many of the fragments in that container were serrated adenoma versus normal tissue.

## 2023-03-24 NOTE — TELEPHONE ENCOUNTER
Dr Ann Akbar    Received fax from [facility] for path report. Collected on 3/17/2023    Letter sent to scanning.

## 2023-03-27 NOTE — TELEPHONE ENCOUNTER
Called [employer] lab and left message for Dr Wen Rogers ([phone]) to call back the office.

## 2023-03-27 NOTE — TELEPHONE ENCOUNTER
Called and spoke to Lulu (senior tech rep) from [facility]. Call was disconnected.

## 2023-03-27 NOTE — TELEPHONE ENCOUNTER
Called [facility] ([phone]) to follow up regarding message below. I spoke to [employer] (David Jackson), she stated our office should receive a call from the pathologist before 11 am tomorrow.

## 2023-03-28 NOTE — TELEPHONE ENCOUNTER
Dr Carmenza Malone pathology dept. I spoke to [employer] rep)    She stated, the pathologist is reviewing the slides. She suggested if we don't hear from them in 2 hrs to call their office. I gave your pager number.

## 2023-03-29 NOTE — TELEPHONE ENCOUNTER
Called Patel to follow up on the message below. I spoke to [facility]. She stated she sent an MD consult message. I provided the direct number to the triage room.

## 2023-03-31 NOTE — TELEPHONE ENCOUNTER
Verified with Dr Jay Pablo if he received a call from [facility] pathology dept.     Per MD, he spoke to the pathologist.

## 2023-04-11 ENCOUNTER — HOSPITAL ENCOUNTER (OUTPATIENT)
Facility: HOSPITAL | Age: 58
Setting detail: OBSERVATION
Discharge: HOME | End: 2023-04-12
Attending: EMERGENCY MEDICINE | Admitting: HOSPITALIST
Payer: COMMERCIAL

## 2023-04-11 ENCOUNTER — APPOINTMENT (OUTPATIENT)
Dept: CT IMAGING | Facility: HOSPITAL | Age: 58
End: 2023-04-11
Attending: EMERGENCY MEDICINE
Payer: COMMERCIAL

## 2023-04-11 ENCOUNTER — TELEPHONE (OUTPATIENT)
Facility: CLINIC | Age: 58
End: 2023-04-11

## 2023-04-11 ENCOUNTER — HOSPITAL ENCOUNTER (INPATIENT)
Facility: HOSPITAL | Age: 58
LOS: 1 days | Discharge: HOME | End: 2023-04-12
Attending: EMERGENCY MEDICINE | Admitting: HOSPITALIST
Payer: COMMERCIAL

## 2023-04-11 ENCOUNTER — ANESTHESIA EVENT (OUTPATIENT)
Dept: SURGERY | Facility: HOSPITAL | Age: 58
End: 2023-04-11
Payer: COMMERCIAL

## 2023-04-11 ENCOUNTER — ANESTHESIA (OUTPATIENT)
Dept: SURGERY | Facility: HOSPITAL | Age: 58
End: 2023-04-11
Payer: COMMERCIAL

## 2023-04-11 DIAGNOSIS — K37 APPENDICITIS: ICD-10-CM

## 2023-04-11 DIAGNOSIS — K35.80 ACUTE APPENDICITIS, UNSPECIFIED ACUTE APPENDICITIS TYPE: Primary | ICD-10-CM

## 2023-04-11 LAB
ALBUMIN SERPL-MCNC: 4.2 G/DL (ref 3.4–5)
ALBUMIN/GLOB SERPL: 1.2 {RATIO} (ref 1–2)
ALP LIVER SERPL-CCNC: 76 U/L
ALT SERPL-CCNC: 36 U/L
ANION GAP SERPL CALC-SCNC: 4 MMOL/L (ref 0–18)
AST SERPL-CCNC: 21 U/L (ref 15–37)
BASOPHILS # BLD AUTO: 0.07 X10(3) UL (ref 0–0.2)
BASOPHILS NFR BLD AUTO: 0.4 %
BILIRUB SERPL-MCNC: 2 MG/DL (ref 0.1–2)
BILIRUB UR QL: NEGATIVE
BUN BLD-MCNC: 17 MG/DL (ref 7–18)
BUN/CREAT SERPL: 13.3 (ref 10–20)
CALCIUM BLD-MCNC: 9.4 MG/DL (ref 8.5–10.1)
CHLORIDE SERPL-SCNC: 107 MMOL/L (ref 98–112)
CLARITY UR: CLEAR
CO2 SERPL-SCNC: 27 MMOL/L (ref 21–32)
COLOR UR: YELLOW
CREAT BLD-MCNC: 1.28 MG/DL
DEPRECATED RDW RBC AUTO: 40.6 FL (ref 35.1–46.3)
EOSINOPHIL # BLD AUTO: 0.01 X10(3) UL (ref 0–0.7)
EOSINOPHIL NFR BLD AUTO: 0.1 %
ERYTHROCYTE [DISTWIDTH] IN BLOOD BY AUTOMATED COUNT: 14 % (ref 11–15)
GFR SERPLBLD BASED ON 1.73 SQ M-ARVRAT: 64 ML/MIN/1.73M2 (ref 60–?)
GLOBULIN PLAS-MCNC: 3.5 G/DL (ref 2.8–4.4)
GLUCOSE BLD-MCNC: 118 MG/DL (ref 70–99)
GLUCOSE UR-MCNC: NEGATIVE MG/DL
HCT VFR BLD AUTO: 49.2 %
HGB BLD-MCNC: 15.5 G/DL
IMM GRANULOCYTES # BLD AUTO: 0.08 X10(3) UL (ref 0–1)
IMM GRANULOCYTES NFR BLD: 0.5 %
INR BLD: 1.17 (ref 0.85–1.16)
LEUKOCYTE ESTERASE UR QL STRIP.AUTO: NEGATIVE
LIPASE SERPL-CCNC: 26 U/L (ref 13–75)
LYMPHOCYTES # BLD AUTO: 0.54 X10(3) UL (ref 1–4)
LYMPHOCYTES NFR BLD AUTO: 3.3 %
MCH RBC QN AUTO: 25.3 PG (ref 26–34)
MCHC RBC AUTO-ENTMCNC: 31.5 G/DL (ref 31–37)
MCV RBC AUTO: 80.4 FL
MONOCYTES # BLD AUTO: 1.07 X10(3) UL (ref 0.1–1)
MONOCYTES NFR BLD AUTO: 6.6 %
NEUTROPHILS # BLD AUTO: 14.44 X10 (3) UL (ref 1.5–7.7)
NEUTROPHILS # BLD AUTO: 14.44 X10(3) UL (ref 1.5–7.7)
NEUTROPHILS NFR BLD AUTO: 89.1 %
NITRITE UR QL STRIP.AUTO: NEGATIVE
OSMOLALITY SERPL CALC.SUM OF ELEC: 289 MOSM/KG (ref 275–295)
PH UR: 5 [PH] (ref 5–8)
PLATELET # BLD AUTO: 302 10(3)UL (ref 150–450)
POTASSIUM SERPL-SCNC: 3.9 MMOL/L (ref 3.5–5.1)
PROT SERPL-MCNC: 7.7 G/DL (ref 6.4–8.2)
PROT UR-MCNC: NEGATIVE MG/DL
PROTHROMBIN TIME: 14.7 SECONDS (ref 11.6–14.8)
RBC # BLD AUTO: 6.12 X10(6)UL
SODIUM SERPL-SCNC: 138 MMOL/L (ref 136–145)
SP GR UR STRIP: 1.02 (ref 1–1.03)
UROBILINOGEN UR STRIP-ACNC: <2
VIT C UR-MCNC: NEGATIVE MG/DL
WBC # BLD AUTO: 16.2 X10(3) UL (ref 4–11)

## 2023-04-11 PROCEDURE — S0030 INJECTION, METRONIDAZOLE: HCPCS | Performed by: STUDENT IN AN ORGANIZED HEALTH CARE EDUCATION/TRAINING PROGRAM

## 2023-04-11 PROCEDURE — 99222 1ST HOSP IP/OBS MODERATE 55: CPT | Performed by: SURGERY

## 2023-04-11 PROCEDURE — 44970 LAPAROSCOPY APPENDECTOMY: CPT | Performed by: SURGERY

## 2023-04-11 PROCEDURE — 74177 CT ABD & PELVIS W/CONTRAST: CPT | Performed by: EMERGENCY MEDICINE

## 2023-04-11 PROCEDURE — 0DTJ4ZZ RESECTION OF APPENDIX, PERCUTANEOUS ENDOSCOPIC APPROACH: ICD-10-PCS | Performed by: SURGERY

## 2023-04-11 PROCEDURE — 99222 1ST HOSP IP/OBS MODERATE 55: CPT | Performed by: HOSPITALIST

## 2023-04-11 RX ORDER — SODIUM CHLORIDE 9 MG/ML
INJECTION, SOLUTION INTRAVENOUS ONCE
Status: COMPLETED | OUTPATIENT
Start: 2023-04-11 | End: 2023-04-11

## 2023-04-11 RX ORDER — ECHINACEA PURPUREA EXTRACT 125 MG
1 TABLET ORAL
Status: DISCONTINUED | OUTPATIENT
Start: 2023-04-11 | End: 2023-04-12

## 2023-04-11 RX ORDER — ONDANSETRON 2 MG/ML
4 INJECTION INTRAMUSCULAR; INTRAVENOUS EVERY 6 HOURS PRN
Status: DISCONTINUED | OUTPATIENT
Start: 2023-04-11 | End: 2023-04-11 | Stop reason: HOSPADM

## 2023-04-11 RX ORDER — ONDANSETRON 2 MG/ML
4 INJECTION INTRAMUSCULAR; INTRAVENOUS ONCE
Status: COMPLETED | OUTPATIENT
Start: 2023-04-11 | End: 2023-04-11

## 2023-04-11 RX ORDER — MORPHINE SULFATE 10 MG/ML
6 INJECTION, SOLUTION INTRAMUSCULAR; INTRAVENOUS EVERY 10 MIN PRN
Status: DISCONTINUED | OUTPATIENT
Start: 2023-04-11 | End: 2023-04-11 | Stop reason: HOSPADM

## 2023-04-11 RX ORDER — LIDOCAINE HYDROCHLORIDE 40 MG/ML
SOLUTION TOPICAL AS NEEDED
Status: DISCONTINUED | OUTPATIENT
Start: 2023-04-11 | End: 2023-04-11 | Stop reason: SURG

## 2023-04-11 RX ORDER — MORPHINE SULFATE 2 MG/ML
2 INJECTION, SOLUTION INTRAMUSCULAR; INTRAVENOUS EVERY 2 HOUR PRN
Status: DISCONTINUED | OUTPATIENT
Start: 2023-04-11 | End: 2023-04-12

## 2023-04-11 RX ORDER — POLYETHYLENE GLYCOL 3350 17 G/17G
17 POWDER, FOR SOLUTION ORAL DAILY PRN
Status: DISCONTINUED | OUTPATIENT
Start: 2023-04-11 | End: 2023-04-12

## 2023-04-11 RX ORDER — METRONIDAZOLE 500 MG/100ML
500 INJECTION, SOLUTION INTRAVENOUS ONCE
Status: COMPLETED | OUTPATIENT
Start: 2023-04-11 | End: 2023-04-11

## 2023-04-11 RX ORDER — SODIUM PHOSPHATE, DIBASIC AND SODIUM PHOSPHATE, MONOBASIC 7; 19 G/133ML; G/133ML
1 ENEMA RECTAL ONCE AS NEEDED
Status: DISCONTINUED | OUTPATIENT
Start: 2023-04-11 | End: 2023-04-12

## 2023-04-11 RX ORDER — METRONIDAZOLE 500 MG/100ML
INJECTION, SOLUTION INTRAVENOUS AS NEEDED
Status: DISCONTINUED | OUTPATIENT
Start: 2023-04-11 | End: 2023-04-11 | Stop reason: SURG

## 2023-04-11 RX ORDER — ROCURONIUM BROMIDE 10 MG/ML
INJECTION, SOLUTION INTRAVENOUS AS NEEDED
Status: DISCONTINUED | OUTPATIENT
Start: 2023-04-11 | End: 2023-04-11 | Stop reason: SURG

## 2023-04-11 RX ORDER — GLYCOPYRROLATE 0.2 MG/ML
INJECTION, SOLUTION INTRAMUSCULAR; INTRAVENOUS AS NEEDED
Status: DISCONTINUED | OUTPATIENT
Start: 2023-04-11 | End: 2023-04-11 | Stop reason: SURG

## 2023-04-11 RX ORDER — BISACODYL 10 MG
10 SUPPOSITORY, RECTAL RECTAL
Status: DISCONTINUED | OUTPATIENT
Start: 2023-04-11 | End: 2023-04-12

## 2023-04-11 RX ORDER — DEXAMETHASONE SODIUM PHOSPHATE 4 MG/ML
VIAL (ML) INJECTION AS NEEDED
Status: DISCONTINUED | OUTPATIENT
Start: 2023-04-11 | End: 2023-04-11 | Stop reason: SURG

## 2023-04-11 RX ORDER — ONDANSETRON 2 MG/ML
INJECTION INTRAMUSCULAR; INTRAVENOUS AS NEEDED
Status: DISCONTINUED | OUTPATIENT
Start: 2023-04-11 | End: 2023-04-11 | Stop reason: SURG

## 2023-04-11 RX ORDER — ACETAMINOPHEN 325 MG/1
650 TABLET ORAL EVERY 4 HOURS PRN
Status: DISCONTINUED | OUTPATIENT
Start: 2023-04-11 | End: 2023-04-12

## 2023-04-11 RX ORDER — MORPHINE SULFATE 2 MG/ML
2 INJECTION, SOLUTION INTRAMUSCULAR; INTRAVENOUS ONCE
Status: COMPLETED | OUTPATIENT
Start: 2023-04-11 | End: 2023-04-11

## 2023-04-11 RX ORDER — NALOXONE HYDROCHLORIDE 0.4 MG/ML
80 INJECTION, SOLUTION INTRAMUSCULAR; INTRAVENOUS; SUBCUTANEOUS AS NEEDED
Status: DISCONTINUED | OUTPATIENT
Start: 2023-04-11 | End: 2023-04-11 | Stop reason: HOSPADM

## 2023-04-11 RX ORDER — MORPHINE SULFATE 4 MG/ML
4 INJECTION, SOLUTION INTRAMUSCULAR; INTRAVENOUS EVERY 2 HOUR PRN
Status: DISCONTINUED | OUTPATIENT
Start: 2023-04-11 | End: 2023-04-12

## 2023-04-11 RX ORDER — ACETAMINOPHEN 500 MG
1000 TABLET ORAL ONCE AS NEEDED
Status: DISCONTINUED | OUTPATIENT
Start: 2023-04-11 | End: 2023-04-11 | Stop reason: HOSPADM

## 2023-04-11 RX ORDER — HYDROCODONE BITARTRATE AND ACETAMINOPHEN 5; 325 MG/1; MG/1
2 TABLET ORAL ONCE AS NEEDED
Status: DISCONTINUED | OUTPATIENT
Start: 2023-04-11 | End: 2023-04-11 | Stop reason: HOSPADM

## 2023-04-11 RX ORDER — PROCHLORPERAZINE EDISYLATE 5 MG/ML
5 INJECTION INTRAMUSCULAR; INTRAVENOUS EVERY 8 HOURS PRN
Status: DISCONTINUED | OUTPATIENT
Start: 2023-04-11 | End: 2023-04-11 | Stop reason: SINTOL

## 2023-04-11 RX ORDER — HYDROCODONE BITARTRATE AND ACETAMINOPHEN 5; 325 MG/1; MG/1
1 TABLET ORAL EVERY 6 HOURS PRN
Status: DISCONTINUED | OUTPATIENT
Start: 2023-04-11 | End: 2023-04-12

## 2023-04-11 RX ORDER — HYDROCODONE BITARTRATE AND ACETAMINOPHEN 5; 325 MG/1; MG/1
1 TABLET ORAL ONCE AS NEEDED
Status: DISCONTINUED | OUTPATIENT
Start: 2023-04-11 | End: 2023-04-11 | Stop reason: HOSPADM

## 2023-04-11 RX ORDER — ONDANSETRON 2 MG/ML
4 INJECTION INTRAMUSCULAR; INTRAVENOUS EVERY 6 HOURS PRN
Status: DISCONTINUED | OUTPATIENT
Start: 2023-04-11 | End: 2023-04-12

## 2023-04-11 RX ORDER — METOCLOPRAMIDE HYDROCHLORIDE 5 MG/ML
10 INJECTION INTRAMUSCULAR; INTRAVENOUS EVERY 6 HOURS PRN
Status: DISCONTINUED | OUTPATIENT
Start: 2023-04-11 | End: 2023-04-12

## 2023-04-11 RX ORDER — MORPHINE SULFATE 4 MG/ML
4 INJECTION, SOLUTION INTRAMUSCULAR; INTRAVENOUS EVERY 10 MIN PRN
Status: DISCONTINUED | OUTPATIENT
Start: 2023-04-11 | End: 2023-04-11 | Stop reason: HOSPADM

## 2023-04-11 RX ORDER — HYDROMORPHONE HYDROCHLORIDE 1 MG/ML
0.6 INJECTION, SOLUTION INTRAMUSCULAR; INTRAVENOUS; SUBCUTANEOUS EVERY 5 MIN PRN
Status: DISCONTINUED | OUTPATIENT
Start: 2023-04-11 | End: 2023-04-11 | Stop reason: HOSPADM

## 2023-04-11 RX ORDER — LIDOCAINE HYDROCHLORIDE 10 MG/ML
INJECTION, SOLUTION EPIDURAL; INFILTRATION; INTRACAUDAL; PERINEURAL AS NEEDED
Status: DISCONTINUED | OUTPATIENT
Start: 2023-04-11 | End: 2023-04-11 | Stop reason: SURG

## 2023-04-11 RX ORDER — BUPIVACAINE HYDROCHLORIDE AND EPINEPHRINE 5; 5 MG/ML; UG/ML
INJECTION, SOLUTION PERINEURAL AS NEEDED
Status: DISCONTINUED | OUTPATIENT
Start: 2023-04-11 | End: 2023-04-11

## 2023-04-11 RX ORDER — MELATONIN
3 NIGHTLY PRN
Status: DISCONTINUED | OUTPATIENT
Start: 2023-04-11 | End: 2023-04-12

## 2023-04-11 RX ORDER — MORPHINE SULFATE 2 MG/ML
1 INJECTION, SOLUTION INTRAMUSCULAR; INTRAVENOUS EVERY 2 HOUR PRN
Status: DISCONTINUED | OUTPATIENT
Start: 2023-04-11 | End: 2023-04-12

## 2023-04-11 RX ORDER — HYDROMORPHONE HYDROCHLORIDE 1 MG/ML
0.4 INJECTION, SOLUTION INTRAMUSCULAR; INTRAVENOUS; SUBCUTANEOUS EVERY 5 MIN PRN
Status: DISCONTINUED | OUTPATIENT
Start: 2023-04-11 | End: 2023-04-11 | Stop reason: HOSPADM

## 2023-04-11 RX ORDER — SENNOSIDES 8.6 MG
17.2 TABLET ORAL NIGHTLY PRN
Status: DISCONTINUED | OUTPATIENT
Start: 2023-04-11 | End: 2023-04-12

## 2023-04-11 RX ORDER — HYDROCODONE BITARTRATE AND ACETAMINOPHEN 5; 325 MG/1; MG/1
1 TABLET ORAL EVERY 4 HOURS PRN
Status: DISCONTINUED | OUTPATIENT
Start: 2023-04-11 | End: 2023-04-12

## 2023-04-11 RX ORDER — DEXTROSE AND SODIUM CHLORIDE 5; .45 G/100ML; G/100ML
INJECTION, SOLUTION INTRAVENOUS CONTINUOUS
Status: DISCONTINUED | OUTPATIENT
Start: 2023-04-11 | End: 2023-04-12

## 2023-04-11 RX ORDER — KETOROLAC TROMETHAMINE 30 MG/ML
INJECTION, SOLUTION INTRAMUSCULAR; INTRAVENOUS AS NEEDED
Status: DISCONTINUED | OUTPATIENT
Start: 2023-04-11 | End: 2023-04-11 | Stop reason: SURG

## 2023-04-11 RX ORDER — SODIUM CHLORIDE, SODIUM LACTATE, POTASSIUM CHLORIDE, CALCIUM CHLORIDE 600; 310; 30; 20 MG/100ML; MG/100ML; MG/100ML; MG/100ML
INJECTION, SOLUTION INTRAVENOUS CONTINUOUS
Status: DISCONTINUED | OUTPATIENT
Start: 2023-04-11 | End: 2023-04-11 | Stop reason: HOSPADM

## 2023-04-11 RX ORDER — MORPHINE SULFATE 2 MG/ML
1 INJECTION, SOLUTION INTRAMUSCULAR; INTRAVENOUS ONCE
Status: COMPLETED | OUTPATIENT
Start: 2023-04-11 | End: 2023-04-11

## 2023-04-11 RX ORDER — MORPHINE SULFATE 2 MG/ML
2 INJECTION, SOLUTION INTRAMUSCULAR; INTRAVENOUS EVERY 10 MIN PRN
Status: DISCONTINUED | OUTPATIENT
Start: 2023-04-11 | End: 2023-04-11 | Stop reason: HOSPADM

## 2023-04-11 RX ORDER — HYDROMORPHONE HYDROCHLORIDE 1 MG/ML
0.2 INJECTION, SOLUTION INTRAMUSCULAR; INTRAVENOUS; SUBCUTANEOUS EVERY 5 MIN PRN
Status: DISCONTINUED | OUTPATIENT
Start: 2023-04-11 | End: 2023-04-11 | Stop reason: HOSPADM

## 2023-04-11 RX ORDER — BENZONATATE 100 MG/1
200 CAPSULE ORAL 3 TIMES DAILY PRN
Status: DISCONTINUED | OUTPATIENT
Start: 2023-04-11 | End: 2023-04-12

## 2023-04-11 RX ORDER — HYDROCODONE BITARTRATE AND ACETAMINOPHEN 5; 325 MG/1; MG/1
2 TABLET ORAL EVERY 4 HOURS PRN
Status: DISCONTINUED | OUTPATIENT
Start: 2023-04-11 | End: 2023-04-12

## 2023-04-11 RX ORDER — SODIUM CHLORIDE, SODIUM LACTATE, POTASSIUM CHLORIDE, CALCIUM CHLORIDE 600; 310; 30; 20 MG/100ML; MG/100ML; MG/100ML; MG/100ML
INJECTION, SOLUTION INTRAVENOUS CONTINUOUS PRN
Status: DISCONTINUED | OUTPATIENT
Start: 2023-04-11 | End: 2023-04-11 | Stop reason: SURG

## 2023-04-11 RX ORDER — METRONIDAZOLE 500 MG/100ML
500 INJECTION, SOLUTION INTRAVENOUS EVERY 8 HOURS
Status: DISCONTINUED | OUTPATIENT
Start: 2023-04-11 | End: 2023-04-12

## 2023-04-11 RX ADMIN — LIDOCAINE HYDROCHLORIDE 4 ML: 40 SOLUTION TOPICAL at 19:02:00

## 2023-04-11 RX ADMIN — KETOROLAC TROMETHAMINE 30 MG: 30 INJECTION, SOLUTION INTRAMUSCULAR; INTRAVENOUS at 19:50:00

## 2023-04-11 RX ADMIN — ROCURONIUM BROMIDE 50 MG: 10 INJECTION, SOLUTION INTRAVENOUS at 19:01:00

## 2023-04-11 RX ADMIN — DEXAMETHASONE SODIUM PHOSPHATE 8 MG: 4 MG/ML VIAL (ML) INJECTION at 19:05:00

## 2023-04-11 RX ADMIN — SODIUM CHLORIDE, SODIUM LACTATE, POTASSIUM CHLORIDE, CALCIUM CHLORIDE: 600; 310; 30; 20 INJECTION, SOLUTION INTRAVENOUS at 18:57:00

## 2023-04-11 RX ADMIN — METRONIDAZOLE 500 MG: 500 INJECTION, SOLUTION INTRAVENOUS at 19:11:00

## 2023-04-11 RX ADMIN — LIDOCAINE HYDROCHLORIDE 50 MG: 10 INJECTION, SOLUTION EPIDURAL; INFILTRATION; INTRACAUDAL; PERINEURAL at 19:01:00

## 2023-04-11 RX ADMIN — GLYCOPYRROLATE 0.2 MG: 0.2 INJECTION, SOLUTION INTRAMUSCULAR; INTRAVENOUS at 19:10:00

## 2023-04-11 RX ADMIN — SODIUM CHLORIDE, SODIUM LACTATE, POTASSIUM CHLORIDE, CALCIUM CHLORIDE: 600; 310; 30; 20 INJECTION, SOLUTION INTRAVENOUS at 19:56:00

## 2023-04-11 RX ADMIN — ONDANSETRON 4 MG: 2 INJECTION INTRAMUSCULAR; INTRAVENOUS at 19:50:00

## 2023-04-11 NOTE — TELEPHONE ENCOUNTER
Patient calling to let Dr. Nancy Love know that he is going to [facility] ED with severe lower abdominal pain (level 8) and vomiting since this morning. Denies diarrhea,blood in stool,body aches or fever.   FYI sent to Dr. Nancy Love

## 2023-04-11 NOTE — TELEPHONE ENCOUNTER
Pt states that he is having severe stomach and has been vomiting this morning and is planning to go to ER. Call transferred to RN.

## 2023-04-11 NOTE — TELEPHONE ENCOUNTER
Health maintenance updated. 3 year colonoscopy recall placed in patient outreach. Next due on 03/17/2026 per Dr. Ruel Reynolds.

## 2023-04-11 NOTE — ED INITIAL ASSESSMENT (HPI)
Pt states acute lower abd pain, mid radiating to rt. Nausea and vomiting. Denies fever. Normal BM this morning. States is pt of dr. James Tai.

## 2023-04-11 NOTE — PROGRESS NOTES
GI  (Brief note, no consult)    27-year-old male with a history of diverticulitis and colon polyps including a recurrent sessile serrated adenoma at the appendiceal orifice. The patient underwent a surveillance colonoscopy on March 17, 2023 with removal of a small residual serrated adenoma at the appendiceal orifice. The remainder of the periappendiceal biopsies were unrevealing. A 1 year surveillance colonoscopy was advised to confirm complete polyp removal.    Ciara Barber was well until this morning when he noted the sudden onset of severe right lower quadrant abdominal pain at 7 AM dissimilar both in intensity (worse today) and location as compared to previous episodes of diverticulitis. He had a bowel movement immediately before the onset of symptoms. His appetite was poor and he vomited after eating a small amount of his breakfast.  He attempted to attend work appointments this morning but could not due to the pain. He drove to the hospital from [place] (which was painful). Evaluation in the ED revealed right lower quadrant tenderness, a leukocytosis and CT findings of acute appendicitis without perforation. The patient is more comfortable after receiving IV pain medication. Physical examination:  Vital signs stable  Comfortable  Abdomen: Slightly distended. There is direct tenderness to palpation the right lower quadrant. The abdomen is not rigid. Impression:  Acute appendicitis:  Likely de stefani. Appendicitis related to the colonoscopic polypectomy (performed with a cold snare technique) would have expected to have occurred periprocedurally weeks prior. Recommendations:  1. Surgical consultation for appendectomy. A cuff of the floor of the cecum should also be removed to exclude persistent sessile serrated adenoma. 2.  Surveillance colonoscopy interval can be increased to 3 years (March 2026).     Tierra Tesfaye MD  [address]

## 2023-04-11 NOTE — ED QUICK NOTES
Orders for admission, patient is aware of plan and ready to go upstairs. Any questions, please call ED RN Lily Zimmerman at extension [phone].      Patient Covid vaccination status: Fully vaccinated     COVID Test Ordered in ED: None    COVID Suspicion at Admission: N/A    Running Infusions:      Mental Status/LOC at time of transport: aox3    Other pertinent information:   CIWA score: N/A   NIH score:  N/A

## 2023-04-11 NOTE — TELEPHONE ENCOUNTER
Noted.  The patient was seen in the emergency room and diagnosed with acute appendicitis. He will undergo an appendectomy later today. A cuff of the base of the cecum will also be excised to exclude persistent sessile serrated adenoma. Colonoscopy recall will be changed to 3 years. GI RNs: Please cancel colonoscopy recall for next year and enter colonoscopy recall for March 2026.

## 2023-04-12 VITALS
RESPIRATION RATE: 18 BRPM | BODY MASS INDEX: 25.41 KG/M2 | TEMPERATURE: 98 F | HEART RATE: 72 BPM | HEIGHT: 70 IN | SYSTOLIC BLOOD PRESSURE: 122 MMHG | WEIGHT: 177.5 LBS | DIASTOLIC BLOOD PRESSURE: 59 MMHG | OXYGEN SATURATION: 96 %

## 2023-04-12 LAB
ANION GAP SERPL CALC-SCNC: 5 MMOL/L (ref 0–18)
BUN BLD-MCNC: 13 MG/DL (ref 7–18)
BUN/CREAT SERPL: 10.3 (ref 10–20)
CALCIUM BLD-MCNC: 8.9 MG/DL (ref 8.5–10.1)
CHLORIDE SERPL-SCNC: 109 MMOL/L (ref 98–112)
CO2 SERPL-SCNC: 24 MMOL/L (ref 21–32)
CREAT BLD-MCNC: 1.26 MG/DL
DEPRECATED RDW RBC AUTO: 39.9 FL (ref 35.1–46.3)
ERYTHROCYTE [DISTWIDTH] IN BLOOD BY AUTOMATED COUNT: 13.8 % (ref 11–15)
GFR SERPLBLD BASED ON 1.73 SQ M-ARVRAT: 65 ML/MIN/1.73M2 (ref 60–?)
GLUCOSE BLD-MCNC: 131 MG/DL (ref 70–99)
HCT VFR BLD AUTO: 43.3 %
HGB BLD-MCNC: 13.6 G/DL
MAGNESIUM SERPL-MCNC: 1.8 MG/DL (ref 1.6–2.6)
MCH RBC QN AUTO: 25.1 PG (ref 26–34)
MCHC RBC AUTO-ENTMCNC: 31.4 G/DL (ref 31–37)
MCV RBC AUTO: 79.9 FL
OSMOLALITY SERPL CALC.SUM OF ELEC: 288 MOSM/KG (ref 275–295)
PHOSPHATE SERPL-MCNC: 2.8 MG/DL (ref 2.5–4.9)
PLATELET # BLD AUTO: 238 10(3)UL (ref 150–450)
POTASSIUM SERPL-SCNC: 4.5 MMOL/L (ref 3.5–5.1)
RBC # BLD AUTO: 5.42 X10(6)UL
SODIUM SERPL-SCNC: 138 MMOL/L (ref 136–145)
WBC # BLD AUTO: 9.6 X10(3) UL (ref 4–11)

## 2023-04-12 PROCEDURE — 99239 HOSP IP/OBS DSCHRG MGMT >30: CPT | Performed by: HOSPITALIST

## 2023-04-12 RX ORDER — MAGNESIUM OXIDE 400 MG/1
400 TABLET ORAL ONCE
Status: COMPLETED | OUTPATIENT
Start: 2023-04-12 | End: 2023-04-12

## 2023-04-12 NOTE — PLAN OF CARE
Problem: Patient Centered Care  Goal: Patient preferences are identified and integrated in the patient's plan of care  Description: Interventions:  - What would you like us to know as we care for you?   - Provide timely, complete, and accurate information to patient/family  - Incorporate patient and family knowledge, values, beliefs, and cultural backgrounds into the planning and delivery of care  - Encourage patient/family to participate in care and decision-making at the level they choose  - Honor patient and family perspectives and choices  Outcome: Progressing     Problem: SAFETY ADULT - FALL  Goal: Free from fall injury  Description: INTERVENTIONS:  - Assess pt frequently for physical needs  - Identify cognitive and physical deficits and behaviors that affect risk of falls.   - Watson fall precautions as indicated by assessment.  - Educate pt/family on patient safety including physical limitations  - Instruct pt to call for assistance with activity based on assessment  - Modify environment to reduce risk of injury  - Provide assistive devices as appropriate  - Consider OT/PT consult to assist with strengthening/mobility  - Encourage toileting schedule  Outcome: Progressing     Problem: PAIN - ADULT  Goal: Verbalizes/displays adequate comfort level or patient's stated pain goal  Description: INTERVENTIONS:  - Encourage pt to monitor pain and request assistance  - Assess pain using appropriate pain scale  - Administer analgesics based on type and severity of pain and evaluate response  - Implement non-pharmacological measures as appropriate and evaluate response  - Consider cultural and social influences on pain and pain management  - Manage/alleviate anxiety  - Utilize distraction and/or relaxation techniques  - Monitor for opioid side effects  - Notify MD/LIP if interventions unsuccessful or patient reports new pain  - Anticipate increased pain with activity and pre-medicate as appropriate  Outcome: Progressing     Problem: RISK FOR INFECTION - ADULT  Goal: Absence of fever/infection during anticipated neutropenic period  Description: INTERVENTIONS  - Monitor WBC  - Administer growth factors as ordered  - Implement neutropenic guidelines  Outcome: Progressing     Problem: DISCHARGE PLANNING  Goal: Discharge to home or other facility with appropriate resources  Description: INTERVENTIONS:  - Identify barriers to discharge w/pt and caregiver  - Include patient/family/discharge partner in discharge planning  - Arrange for needed discharge resources and transportation as appropriate  - Identify discharge learning needs (meds, wound care, etc)  - Arrange for interpreters to assist at discharge as needed  - Consider post-discharge preferences of patient/family/discharge partner  - Complete POLST form as appropriate  - Assess patient's ability to be responsible for managing their own health  - Refer to Case Management Department for coordinating discharge planning if the patient needs post-hospital services based on physician/LIP order or complex needs related to functional status, cognitive ability or social support system  Outcome: Progressing     Pt alert and oriented. Surgical dressing CDI. Ambulates independently. Tolerating diet. Denies pain/discomfort. Call light within reach. Bed in lowest and locked position. Plan for home when cleared.

## 2023-04-12 NOTE — PLAN OF CARE
Problem: Patient Centered Care  Goal: Patient preferences are identified and integrated in the patient's plan of care  Description: Interventions:  - What would you like us to know as we care for you? Patient is from home with spouse who also his support system. - Provide timely, complete, and accurate information to patient/family  - Incorporate patient and family knowledge, values, beliefs, and cultural backgrounds into the planning and delivery of care  - Encourage patient/family to participate in care and decision-making at the level they choose  - Honor patient and family perspectives and choices  Outcome: Adequate for Discharge     Problem: Patient/Family Goals  Goal: Patient/Family Long Term Goal  Description: Patient's Long Term Goal: Will have no complications from surgery. Interventions:  - No tub bathing nor swimming in pools. - Monitor incision for any signs of infection.  - No heavy lifting.  - Pain management with oral medication.  - May use to incision to prevent swelling or help reduce pain. - See additional Care Plan goals for specific interventions  Outcome: Adequate for Discharge  Goal: Patient/Family Short Term Goal  Description: Patient's Short Term Goal: Home when stable. Interventions:   - Advance diet as tolerated. - Up as tolerated to promote passing gas. - Monitor incision for any signs of infection.  - No heavy lifting.  - Pain management with oral medication.  - May use to incision to prevent swelling or help reduce pain. - IV antibiotics and IVF as ordered. - VS and labs as ordered. - See additional Care Plan goals for specific interventions  Outcome: Adequate for Discharge     Problem: SAFETY ADULT - FALL  Goal: Free from fall injury  Description: INTERVENTIONS:  - Assess pt frequently for physical needs  - Identify cognitive and physical deficits and behaviors that affect risk of falls.   - [place] fall precautions as indicated by assessment.  - Educate pt/family on patient safety including physical limitations  - Instruct pt to call for assistance with activity based on assessment  - Modify environment to reduce risk of injury  - Provide assistive devices as appropriate  - Consider OT/PT consult to assist with strengthening/mobility  - Encourage toileting schedule  Outcome: Adequate for Discharge     Problem: PAIN - ADULT  Goal: Verbalizes/displays adequate comfort level or patient's stated pain goal  Description: INTERVENTIONS:  - Encourage pt to monitor pain and request assistance  - Assess pain using appropriate pain scale  - Administer analgesics based on type and severity of pain and evaluate response  - Implement non-pharmacological measures as appropriate and evaluate response  - Consider cultural and social influences on pain and pain management  - Manage/alleviate anxiety  - Utilize distraction and/or relaxation techniques  - Monitor for opioid side effects  - Notify MD/LIP if interventions unsuccessful or patient reports new pain  - Anticipate increased pain with activity and pre-medicate as appropriate  Outcome: Adequate for Discharge     Problem: RISK FOR INFECTION - ADULT  Goal: Absence of fever/infection during anticipated neutropenic period  Description: INTERVENTIONS  - Monitor WBC  - Administer growth factors as ordered  - Implement neutropenic guidelines  Outcome: Adequate for Discharge     Problem: DISCHARGE PLANNING  Goal: Discharge to home or other facility with appropriate resources  Description: INTERVENTIONS:  - Identify barriers to discharge w/pt and caregiver  - Include patient/family/discharge partner in discharge planning  - Arrange for needed discharge resources and transportation as appropriate  - Identify discharge learning needs (meds, wound care, etc)  - Arrange for interpreters to assist at discharge as needed  - Consider post-discharge preferences of patient/family/discharge partner  - Complete POLST form as appropriate  - Assess patient's ability to be responsible for managing their own health  - Refer to Case Management Department for coordinating discharge planning if the patient needs post-hospital services based on physician/LIP order or complex needs related to functional status, cognitive ability or social support system  Outcome: Adequate for Discharge     Problem: GASTROINTESTINAL - ADULT  Goal: Minimal or absence of nausea and vomiting  Description: INTERVENTIONS:  - Maintain adequate hydration with IV or PO as ordered and tolerated  - Nasogastric tube to low intermittent suction as ordered  - Evaluate effectiveness of ordered antiemetic medications  - Provide nonpharmacologic comfort measures as appropriate  - Advance diet as tolerated, if ordered  - Obtain nutritional consult as needed  - Evaluate fluid balance  Outcome: Adequate for Discharge  Goal: Maintains or returns to baseline bowel function  Description: INTERVENTIONS:  - Assess bowel function  - Maintain adequate hydration with IV or PO as ordered and tolerated  - Evaluate effectiveness of GI medications  - Encourage mobilization and activity  - Obtain nutritional consult as needed  - Establish a toileting routine/schedule  - Consider collaborating with pharmacy to review patient's medication profile  Outcome: Adequate for Discharge    Patient is alert and oriented, aware to call for help as needed. Patient is currently in room air, denies shortness of breathing nor chest pain. Patient is up independently in room and hallway. Patient verbalized that he already had passed gas. Patient is voiding well. Patient is denying pain. Patient will be going home soon.

## 2023-04-12 NOTE — ANESTHESIA PROCEDURE NOTES
Airway  Date/Time: 4/11/2023 7:02 PM  Urgency: Elective    Airway not difficult    General Information and Staff    Patient location during procedure: OR  Anesthesiologist: Riana Samrt MD  Performed: anesthesiologist   Performed by: Riana Smart MD  Authorized by: Riana Smart MD      Indications and Patient Condition  Indications for airway management: anesthesia  Sedation level: deep  Preoxygenated: yes  Patient position: sniffing  Mask difficulty assessment: 0 - not attempted    Final Airway Details  Final airway type: endotracheal airway      Successful airway: ETT  Cuffed: yes   Successful intubation technique: direct laryngoscopy  Endotracheal tube insertion site: oral  Blade: Colleen  Blade size: #4  ETT size (mm): 7.0    Cormack-Lehane Classification: grade I - full view of glottis  Placement verified by: chest auscultation and capnometry   Measured from: teeth  ETT to teeth (cm): 21  Number of attempts at approach: 1

## 2023-04-12 NOTE — OPERATIVE REPORT
Pre-Operative Diagnosis: Appendicitis [K37] Appendiceal base junction of the cecum polyp     Post-Operative Diagnosis: Same     Procedure Performed:   LAPAROSCOPIC APPENDECTOMY WITH PARTIAL CECECTOMY    Surgeon(s) and Role:     * Bernardo Bender MD - Primary    Assistant(s):  Surgical Assistant.: Savage Hurd I     Surgical Findings: Acute appendicitis with inflammatory changes at the junction of the cecum     Specimen: Appendix     Estimated Blood Loss: Blood Output: 5 mL (4/11/2023  3:92 PM)    Complication:  None    INDICATION:  Pt is a 59year old male who with Appendicitis [facility] who is scheduled for a LAPAROSCOPIC APPENDECTOMY WITH PARTIAL CECECTOMY. CONSENT:  An informed consent discussion was held with the patient regarding the nature of acute appendicitis, the treatment options and the details of the procedure. The risks including but not limited to bleeding, wound infection, intra-abdominal infection, injury to the colon, small intestine, right ureter, incomplete resection, and incisional hernia were discussed. The patient expressed understanding and wants to proceed with the planned procedure. TECHNIQUE:  The patient was taken to the OR and placed in supine position. General anesthesia was established and the abdomen was prepped in standard fashion. Pneumoperitoneum was obtained using open technique through a 1 cm umbilical incision. A 12-mm trocar was inserted under direct visualization  without complication. Examination of the abdomen showed an inflamed and dilated appendix consistent with acute non-perforated appendicitis. Two 5-mm trocars were placed in the LLQ and supra-pubic area. The patient was placed in Trendelenburg position. The mesoappendix was divided using the linear stapler. The staple line was complete and hemostatic. The base of the cecum was divided proximal to the area of inflammation at a normal appearing area using a 45 x 3.5 mm linear stapler.   The staple line was complete, intact, and hemostatic. The appendix and a portion of the cecum was delivered from the abdomen using an endocatch bag. The abdomen was irrigated with saline and found to be hemostatic. The staple line was hemostatic. The ports were withdrawn under direct visualization and no port site bleeding was seen. The umbilical fascia and the left lower quadrant incision were closed using 0 vicryl. The skin incisions were closed using 4-0 vicryl. Marcaine was injected and dermabond applied. Steri strips were applied. All instrument and sponge counts were correct. The estimated blood loss was less than 15cc. I was present during the entire procedure.         Dilma Ness MD;

## 2023-04-12 NOTE — BRIEF OP NOTE
Pre-Operative Diagnosis: Appendicitis [K37] Appendiceal base junction of the cecum polyp     Post-Operative Diagnosis: Same     Procedure Performed:   LAPAROSCOPIC APPENDECTOMY WITH PARTIAL CECECTOMY    Surgeon(s) and Role:     * Arian Sepulveda MD - Primary    Assistant(s):  Surgical Assistant.: Lee Vargas     Surgical Findings: Acute appendicitis with inflammatory changes at the junction of the cecum     Specimen: Appendix     Estimated Blood Loss: Blood Output: 5 mL (4/11/2023  7:50 PM)           Broderick Frye MD  4/11/2023  7:55 PM

## 2023-04-13 ENCOUNTER — PATIENT OUTREACH (OUTPATIENT)
Dept: CASE MANAGEMENT | Age: 58
End: 2023-04-13

## 2023-04-14 ENCOUNTER — TELEPHONE (OUTPATIENT)
Facility: CLINIC | Age: 58
End: 2023-04-14

## 2023-04-14 NOTE — TELEPHONE ENCOUNTER
I left a message on the patient's personal voicemail that the surgical pathology revealed no residual sessile serrated adenoma. A surveillance colonoscopy would be advised in 3 years. I have asked Elsie Dennis to contact me with any questions.

## 2023-09-25 RX ORDER — PANTOPRAZOLE SODIUM 40 MG/1
40 TABLET, DELAYED RELEASE ORAL
Qty: 90 TABLET | Refills: 3 | Status: SHIPPED | OUTPATIENT
Start: 2023-09-25

## 2023-09-25 NOTE — TELEPHONE ENCOUNTER
Requested Prescriptions     Pending Prescriptions Disp Refills    PANTOPRAZOLE 40 MG Oral Tab EC [Pharmacy Med Name: Pantoprazole Sodium 40 MG Oral Tablet Delayed Release] 90 tablet 3     Sig: TAKE 1 TABLET BY MOUTH DAILY  WITH FOOD     LOV: Procedure: 3/17/2023   Last Refill: 10/24/2022

## 2024-01-19 ENCOUNTER — HOSPITAL ENCOUNTER (OUTPATIENT)
Dept: CT IMAGING | Facility: HOSPITAL | Age: 59
Discharge: HOME | End: 2024-01-19
Attending: INTERNAL MEDICINE
Payer: COMMERCIAL

## 2024-01-19 DIAGNOSIS — K57.92 DIVERTICULITIS: ICD-10-CM

## 2024-01-19 LAB
CREAT BLD-MCNC: 1.3 MG/DL
EGFRCR SERPLBLD CKD-EPI 2021: 63 ML/MIN/1.73M2 (ref 60–?)

## 2024-01-19 PROCEDURE — 71260 CT THORAX DX C+: CPT | Performed by: INTERNAL MEDICINE

## 2024-01-19 PROCEDURE — 82565 ASSAY OF CREATININE: CPT

## 2024-01-19 PROCEDURE — 74177 CT ABD & PELVIS W/CONTRAST: CPT | Performed by: INTERNAL MEDICINE

## 2024-03-04 ENCOUNTER — LAB ENCOUNTER (OUTPATIENT)
Dept: LAB | Facility: HOSPITAL | Age: 59
End: 2024-03-04
Attending: INTERNAL MEDICINE
Payer: COMMERCIAL

## 2024-03-04 DIAGNOSIS — Z01.818 PRE-OP TESTING: Primary | ICD-10-CM

## 2024-03-04 LAB
ALBUMIN SERPL-MCNC: 4.8 G/DL (ref 3.2–4.8)
ALBUMIN/GLOB SERPL: 1.8 {RATIO} (ref 1–2)
ALP LIVER SERPL-CCNC: 79 U/L
ALT SERPL-CCNC: 27 U/L
ANION GAP SERPL CALC-SCNC: 3 MMOL/L (ref 0–18)
AST SERPL-CCNC: 22 U/L (ref ?–34)
BASOPHILS # BLD AUTO: 0.1 X10(3) UL (ref 0–0.2)
BASOPHILS NFR BLD AUTO: 1 %
BILIRUB SERPL-MCNC: 1 MG/DL (ref 0.2–1.1)
BUN BLD-MCNC: 19 MG/DL (ref 9–23)
BUN/CREAT SERPL: 12.8 (ref 10–20)
CALCIUM BLD-MCNC: 9.8 MG/DL (ref 8.7–10.4)
CHLORIDE SERPL-SCNC: 107 MMOL/L (ref 98–112)
CO2 SERPL-SCNC: 28 MMOL/L (ref 21–32)
CREAT BLD-MCNC: 1.48 MG/DL
DEPRECATED RDW RBC AUTO: 38.5 FL (ref 35.1–46.3)
EGFRCR SERPLBLD CKD-EPI 2021: 53 ML/MIN/1.73M2 (ref 60–?)
EOSINOPHIL # BLD AUTO: 0.09 X10(3) UL (ref 0–0.7)
EOSINOPHIL NFR BLD AUTO: 0.9 %
ERYTHROCYTE [DISTWIDTH] IN BLOOD BY AUTOMATED COUNT: 13.5 % (ref 11–15)
FASTING STATUS PATIENT QL REPORTED: NO
GLOBULIN PLAS-MCNC: 2.7 G/DL (ref 2.8–4.4)
GLUCOSE BLD-MCNC: 84 MG/DL (ref 70–99)
HCT VFR BLD AUTO: 49 %
HGB BLD-MCNC: 15.5 G/DL
IMM GRANULOCYTES # BLD AUTO: 0.12 X10(3) UL (ref 0–1)
IMM GRANULOCYTES NFR BLD: 1.2 %
LYMPHOCYTES # BLD AUTO: 2.13 X10(3) UL (ref 1–4)
LYMPHOCYTES NFR BLD AUTO: 20.7 %
MCH RBC QN AUTO: 25.2 PG (ref 26–34)
MCHC RBC AUTO-ENTMCNC: 31.6 G/DL (ref 31–37)
MCV RBC AUTO: 79.8 FL
MONOCYTES # BLD AUTO: 0.78 X10(3) UL (ref 0.1–1)
MONOCYTES NFR BLD AUTO: 7.6 %
NEUTROPHILS # BLD AUTO: 7.08 X10 (3) UL (ref 1.5–7.7)
NEUTROPHILS # BLD AUTO: 7.08 X10(3) UL (ref 1.5–7.7)
NEUTROPHILS NFR BLD AUTO: 68.6 %
OSMOLALITY SERPL CALC.SUM OF ELEC: 287 MOSM/KG (ref 275–295)
PLATELET # BLD AUTO: 354 10(3)UL (ref 150–450)
POTASSIUM SERPL-SCNC: 4 MMOL/L (ref 3.5–5.1)
PROT SERPL-MCNC: 7.5 G/DL (ref 5.7–8.2)
RBC # BLD AUTO: 6.14 X10(6)UL
SODIUM SERPL-SCNC: 138 MMOL/L (ref 136–145)
WBC # BLD AUTO: 10.3 X10(3) UL (ref 4–11)

## 2024-03-04 PROCEDURE — 80053 COMPREHEN METABOLIC PANEL: CPT

## 2024-03-04 PROCEDURE — 85025 COMPLETE CBC W/AUTO DIFF WBC: CPT

## 2024-03-04 PROCEDURE — 36415 COLL VENOUS BLD VENIPUNCTURE: CPT

## 2024-03-04 NOTE — H&P
I saw and examined the patient agree with attached findings.  I discussed the risks and benefits of the procedure at length and patient was agreeable to proceed with JERRY to rule out endocarditis in the setting of fever of unknown origin.    Alexander Jacobson MD  [facility] [place]

## 2024-03-15 ENCOUNTER — ANESTHESIA EVENT (OUTPATIENT)
Dept: INTERVENTIONAL RADIOLOGY/VASCULAR | Facility: HOSPITAL | Age: 59
End: 2024-03-15
Payer: COMMERCIAL

## 2024-03-15 ENCOUNTER — HOSPITAL ENCOUNTER (OUTPATIENT)
Dept: INTERVENTIONAL RADIOLOGY/VASCULAR | Facility: HOSPITAL | Age: 59
Discharge: HOME | End: 2024-03-15
Attending: INTERNAL MEDICINE | Admitting: STUDENT IN AN ORGANIZED HEALTH CARE EDUCATION/TRAINING PROGRAM
Payer: COMMERCIAL

## 2024-03-15 ENCOUNTER — HOSPITAL ENCOUNTER (OUTPATIENT)
Dept: CV DIAGNOSTICS | Facility: HOSPITAL | Age: 59
Discharge: HOME | End: 2024-03-15
Attending: INTERNAL MEDICINE
Payer: COMMERCIAL

## 2024-03-15 VITALS
BODY MASS INDEX: 27.25 KG/M2 | HEART RATE: 51 BPM | OXYGEN SATURATION: 99 % | WEIGHT: 184 LBS | RESPIRATION RATE: 12 BRPM | SYSTOLIC BLOOD PRESSURE: 135 MMHG | DIASTOLIC BLOOD PRESSURE: 79 MMHG | HEIGHT: 69 IN | TEMPERATURE: 98 F

## 2024-03-15 DIAGNOSIS — I48.0 PAROXYSMAL ATRIAL FIBRILLATION (HCC): ICD-10-CM

## 2024-03-15 DIAGNOSIS — R50.9 FEVER, UNKNOWN ORIGIN: ICD-10-CM

## 2024-03-15 PROCEDURE — 93325 DOPPLER ECHO COLOR FLOW MAPG: CPT | Performed by: INTERNAL MEDICINE

## 2024-03-15 PROCEDURE — 93320 DOPPLER ECHO COMPLETE: CPT | Performed by: INTERNAL MEDICINE

## 2024-03-15 PROCEDURE — B24BZZ4 ULTRASONOGRAPHY OF HEART WITH AORTA, TRANSESOPHAGEAL: ICD-10-PCS | Performed by: STUDENT IN AN ORGANIZED HEALTH CARE EDUCATION/TRAINING PROGRAM

## 2024-03-15 PROCEDURE — 36415 COLL VENOUS BLD VENIPUNCTURE: CPT

## 2024-03-15 PROCEDURE — 93312 ECHO TRANSESOPHAGEAL: CPT | Performed by: STUDENT IN AN ORGANIZED HEALTH CARE EDUCATION/TRAINING PROGRAM

## 2024-03-15 RX ORDER — SODIUM CHLORIDE 0.9 % (FLUSH) 0.9 %
10 SYRINGE (ML) INJECTION AS NEEDED
Status: DISCONTINUED | OUTPATIENT
Start: 2024-03-15 | End: 2024-03-15

## 2024-03-15 RX ORDER — MIDAZOLAM HYDROCHLORIDE 1 MG/ML
INJECTION INTRAMUSCULAR; INTRAVENOUS
Status: COMPLETED
Start: 2024-03-15 | End: 2024-03-15

## 2024-03-15 RX ORDER — SODIUM CHLORIDE 9 MG/ML
INJECTION, SOLUTION INTRAVENOUS
Status: COMPLETED | OUTPATIENT
Start: 2024-03-15 | End: 2024-03-15

## 2024-03-15 RX ORDER — MIDAZOLAM HYDROCHLORIDE 1 MG/ML
INJECTION INTRAMUSCULAR; INTRAVENOUS AS NEEDED
Status: DISCONTINUED | OUTPATIENT
Start: 2024-03-15 | End: 2024-03-15 | Stop reason: SURG

## 2024-03-15 RX ADMIN — SODIUM CHLORIDE: 9 INJECTION, SOLUTION INTRAVENOUS at 11:30:00

## 2024-03-15 RX ADMIN — MIDAZOLAM HYDROCHLORIDE 2 MG: 1 INJECTION INTRAMUSCULAR; INTRAVENOUS at 12:04:00

## 2024-03-15 NOTE — ANESTHESIA PREPROCEDURE EVALUATION
Anesthesia PreOp Note    HPI:     Zenon Fernandez is a 59 year old male who presents for preoperative consultation requested by: * No surgeons listed *    Date of Surgery: 3/15/2024    * No procedures listed *  Indication: * No pre-op diagnosis entered *    Relevant Problems   No relevant active problems       NPO:  Last Liquid Consumption Date: 03/14/24  Last Liquid Consumption Time: 1930  Last Solid Consumption Date: 03/14/24  Last Solid Consumption Time: 1930  Last Liquid Consumption Date: 03/14/24          History Review:  Patient Active Problem List    Diagnosis Date Noted    Acute appendicitis, unspecified acute appendicitis type 04/11/2023    Aseptic loosening of prosthetic hip (HCC) 08/10/2018    Primary osteoarthritis of right hip 04/04/2018    Essential hypertension 04/04/2018    Hyperlipidemia 04/04/2018    Paroxysmal A-fib (HCC) 04/04/2018    Asthma (HCC) 04/04/2018    Arthritis of right hip 04/04/2018    GERD (gastroesophageal reflux disease) 06/25/2015    History of colon polyps 06/25/2015       Past Medical History:   Diagnosis Date    Arrhythmia     Afib    Asthma (HCC)     allergy induced    Back problem     BPH (benign prostatic hyperplasia)     Diverticulosis of large intestine     Esophageal reflux     Hepatitis     1986 from mono    High cholesterol     History of cystoscopy     Mononucleosis     Osteoarthritis     Pneumonia due to organism     Torsion of testicle 1979       Past Surgical History:   Procedure Laterality Date    COLONOSCOPY      COLONOSCOPY N/A 7/25/2019    Procedure: COLONOSCOPY;  Surgeon: Srinivas Staton MD;  Location: [facility] ENDOSCOPY    COLONOSCOPY WITH BIOPSY      EP PULMONARY VEIN/A-FIB ABLATION  3/18/2022         FRACTURE SURGERY      ORCHIOPEXY    1979    DETORSION OF TESTICLE    OTHER Left 1982    INTERNAL FIXATION TIBIA/FIBULA    OTHER SURGICAL HISTORY Right     BONE LENGTHENING PX ON RT FEMUR, multiple surgeries     OTHER SURGICAL HISTORY  10/28/2021    Urolift  with Dr Ye    TOTAL HIP REPLACEMENT Right 04/04/2018    UPPER GI ENDOSCOPY,EXAM      VASECTOMY         Medications Prior to Admission   Medication Sig Dispense Refill Last Dose    pantoprazole 40 MG Oral Tab EC Take 1 tablet (40 mg total) by mouth daily with food. 90 tablet 3 3/15/2024    rosuvastatin 10 MG Oral Tab Take 1 tablet (10 mg total) by mouth daily.   3/15/2024    ADVAIR DISKUS 250-50 MCG/DOSE Inhalation Aerosol Powder, Breath Activated Inhale 1 puff into the lungs daily.   3/14/2024     Current Facility-Administered Medications Ordered in Epic   Medication Dose Route Frequency Provider Last Rate Last Admin    sodium chloride 0.9% 0.9% flush injection 10 mL  10 mL Intravenous PRN Alexander Jacobson MD         No current [facility]-ordered outpatient medications on file.       Allergies   Allergen Reactions    Ancef [Cefazolin] ITCHING    Prochlorperazine UNKNOWN     LOCK JAW  Patient states he experiences lockjaw      Pollen Extract Runny nose       Family History   Problem Relation Age of Onset    Skin cancer Mother     Dementia Father      Social History     Socioeconomic History    Marital status:    Tobacco Use    Smoking status: Never    Smokeless tobacco: Never   Vaping Use    Vaping Use: Never used   Substance and Sexual Activity    Alcohol use: Yes     Alcohol/week: 3.0 - 4.0 standard drinks of alcohol     Types: 3 - 4 Standard drinks or equivalent per week     Comment: on occ    Drug use: No   Other Topics Concern    Caffeine Concern Yes     Comment: Coffee 2 cups daily       Available pre-op labs reviewed.  Lab Results   Component Value Date    WBC 10.3 03/04/2024    RBC 6.14 (H) 03/04/2024    HGB 15.5 03/04/2024    HCT 49.0 03/04/2024    MCV 79.8 (L) 03/04/2024    MCH 25.2 (L) 03/04/2024    MCHC 31.6 03/04/2024    RDW 13.5 03/04/2024    .0 03/04/2024     Lab Results   Component Value Date     03/04/2024    K 4.0 03/04/2024     03/04/2024    CO2 28.0 03/04/2024    BUN 19  03/04/2024    CREATSERUM 1.48 (H) 03/04/2024    GLU 84 03/04/2024    CA 9.8 03/04/2024          Vital Signs:  Body mass index is 27.17 kg/m².   height is 1.753 m (5' 9\") and weight is 83.5 kg (184 lb). His blood pressure is 140/90 and his pulse is 60. His respiration is 14 and oxygen saturation is 98%.   Vitals:    03/14/24 0839 03/15/24 1145   BP:  140/90   Pulse:  60   Resp:  14   SpO2:  98%   Weight: 83.5 kg (184 lb)    Height: 1.753 m (5' 9\")         Anesthesia Evaluation     Patient summary reviewed and Nursing notes reviewed    Airway   Mallampati: I  Dental      Pulmonary - negative ROS and normal exam   Cardiovascular - normal exam  Exercise tolerance: good    NYHA Classification: I    Neuro/Psych - negative ROS     GI/Hepatic/Renal - negative ROS     Endo/Other - negative ROS   Abdominal                  Anesthesia Plan:   ASA:  3  Plan:   MAC  Post-op Pain Management: IV analgesics      I have informed Zenon Mathew Jim and/or legal guardian or family member of the nature of the anesthetic plan, benefits, risks including possible dental damage if relevant, major complications, and any alternative forms of anesthetic management.   All of the patient's questions were answered to the best of my ability. The patient desires the anesthetic management as planned.  IVETH HECTOR MD  3/15/2024 11:57 AM  Present on Admission:  **None**

## 2024-03-15 NOTE — ANESTHESIA POSTPROCEDURE EVALUATION
Patient: Zenon Fernandez    Procedure Summary       Date: 03/15/24 Room / Location: [facility]    Anesthesia Start: 1205 Anesthesia Stop:     Procedure: EP JERRY Diagnosis:       Paroxysmal atrial fibrillation (HCC)      Fever, unknown origin      Paroxysmal atrial fibrillation (HCC)      Fever, unknown origin    Scheduled Providers: Alexander Jacobson MD; Iveth Hector MD Anesthesiologist: Iveth Hector MD    Anesthesia Type: MAC ASA Status: 3            Anesthesia Type: MAC    Vitals Value Taken Time   /67 03/15/24 1224   Temp 98 03/15/24 1224   Pulse 65 03/15/24 1224   Resp 12 03/15/24 1224   SpO2 99 03/15/24 1224       [facility] AN Post Evaluation:   Patient Evaluated in floor  Patient Participation: complete - patient participated  Level of Consciousness: awake  Pain Management: adequate  Airway Patency:patent  Dental exam unchanged from preop  Yes    Cardiovascular Status: acceptable  Respiratory Status: acceptable  Postoperative Hydration acceptable      IVETH HECTOR MD  3/15/2024 12:24 PM

## 2024-03-15 NOTE — DISCHARGE INSTRUCTIONS
HOME CARE INSTRUCTIONS FOLLOWING  TRANSESOPHAGEAL ECHOCARDIOGRAPHY  (JERRY)    Activity    DO NOT drive after the procedure. You may resume driving the following day    Do not operate any machinery (including kitchen appliances or power tools)    Avoid drinking alcohol for 24 hours    You may resume your normal activities tomorrow    Do not eat anything for two hours. Sip cool liquids initially and advance to a soft diet       Tonight    What is normal    You may experience a sore throat for 2 to 3 days following the examination    Gargling with warm salt water (1/2 teaspoon of salt to 8 oz. of warm water) or using      throat lozenges will help to soothe your throat    When you should NOTIFY YOUR PHYSICIAN     If you experience sharp pain in your neck, abdomen, or chest     If you have sudden nausea and/or vomiting     If you vomit blood     If you have a fever greater than 100 o    Other     You may resume your present diet, unless otherwise specified by your physician     You may resume all of your medications as prescribed, unless otherwise directed by       your physician. A list of your medications was provided to you at discharge     Please call your physician’s office for a follow-up appointment.

## 2024-03-15 NOTE — PROGRESS NOTES
JERRY done by Dr. Jacobson with Dr. Salazar anesthesia. Pt tolerated fluid. Instructions given. Pt/Family verbalized understanding.  Follow up with Dr. Thornton as needed.

## 2024-04-09 NOTE — OCCUPATIONAL THERAPY NOTE
Follow up with Dr. Vinson in 1 year     Obtain a PSA prior to your follow up appointment.      48 hours before your PSA test you should NOT:  Ride a bike, motorcycle or tractor or anything that puts pressure on the prostate region.   Have had a Digital rectal exam.   Ejaculate or participate in any sexual activity that involves ejaculation.         OCCUPATIONAL THERAPY TREATMENT NOTE - INPATIENT    Room Number: 431/431-A         Presenting Problem:  (R CAMILA revision due to aspetic loosening of prosthetic hip )     Problem List  Principal Problem:    Aseptic loosening of prosthetic hip ([address]  Active Pr ‘6-Clicks’ Inpatient Daily Activity Short Form  How much help from another person does the patient currently need…  -   Putting on and taking off regular lower body clothing?: A Little (SBA)  -   Bathing (including washing, rinsing, drying)?: A Little (SBA activities. Comment:Verbally reviewed with patient and wife.  Patient is able to adhere to weightbearing precautions independently.             Goals  on: 2018  Frequency: 1-2 more OT sessions

## 2024-04-15 ENCOUNTER — HOSPITAL ENCOUNTER (OUTPATIENT)
Dept: NUCLEAR MEDICINE | Facility: HOSPITAL | Age: 59
Discharge: HOME | End: 2024-04-15
Attending: INTERNAL MEDICINE
Payer: COMMERCIAL

## 2024-04-15 DIAGNOSIS — R50.9 FUO (FEVER OF UNKNOWN ORIGIN): ICD-10-CM

## 2024-04-15 PROCEDURE — 78802 RP LOCLZJ TUM WHBDY 1 D IMG: CPT | Performed by: INTERNAL MEDICINE

## 2024-04-16 ENCOUNTER — HOSPITAL ENCOUNTER (OUTPATIENT)
Dept: NUCLEAR MEDICINE | Facility: HOSPITAL | Age: 59
Discharge: HOME | End: 2024-04-16
Attending: INTERNAL MEDICINE
Payer: COMMERCIAL

## 2024-04-17 ENCOUNTER — HOSPITAL ENCOUNTER (OUTPATIENT)
Dept: NUCLEAR MEDICINE | Facility: HOSPITAL | Age: 59
Discharge: HOME | End: 2024-04-17
Attending: INTERNAL MEDICINE
Payer: COMMERCIAL

## 2024-04-18 ENCOUNTER — HOSPITAL ENCOUNTER (OUTPATIENT)
Dept: NUCLEAR MEDICINE | Facility: HOSPITAL | Age: 59
Discharge: HOME | End: 2024-04-18
Attending: INTERNAL MEDICINE
Payer: COMMERCIAL

## 2024-08-06 RX ORDER — PANTOPRAZOLE SODIUM 40 MG/1
40 TABLET, DELAYED RELEASE ORAL
Qty: 90 TABLET | Refills: 3 | Status: SHIPPED | OUTPATIENT
Start: 2024-08-06

## 2024-08-06 NOTE — TELEPHONE ENCOUNTER
Requested Prescriptions     Pending Prescriptions Disp Refills    PANTOPRAZOLE 40 MG Oral Tab EC [Pharmacy Med Name: Pantoprazole Sodium 40 MG Oral Tablet Delayed Release] 90 tablet 3     Sig: TAKE 1 TABLET BY MOUTH DAILY  WITH FOOD         LOV: procedure 3/17/2023    Follow up appointment scheduled: 10/14/2024    LR    9/25/2023      [place]

## 2024-10-14 ENCOUNTER — OFFICE VISIT (OUTPATIENT)
Facility: CLINIC | Age: 59
End: 2024-10-14
Payer: COMMERCIAL

## 2024-10-14 VITALS
SYSTOLIC BLOOD PRESSURE: 129 MMHG | BODY MASS INDEX: 27 KG/M2 | DIASTOLIC BLOOD PRESSURE: 75 MMHG | WEIGHT: 183 LBS | HEART RATE: 66 BPM

## 2024-10-14 DIAGNOSIS — K21.00 GASTROESOPHAGEAL REFLUX DISEASE WITH ESOPHAGITIS WITHOUT HEMORRHAGE: ICD-10-CM

## 2024-10-14 DIAGNOSIS — R10.32 LLQ PAIN: Primary | ICD-10-CM

## 2024-10-14 RX ORDER — CIPROFLOXACIN 500 MG/1
500 TABLET, FILM COATED ORAL 2 TIMES DAILY
Qty: 28 TABLET | Refills: 0 | Status: SHIPPED | OUTPATIENT
Start: 2024-10-14

## 2024-10-14 RX ORDER — METRONIDAZOLE 500 MG/1
500 TABLET ORAL 2 TIMES DAILY
Qty: 28 TABLET | Refills: 0 | Status: SHIPPED | OUTPATIENT
Start: 2024-10-14

## 2024-10-14 RX ORDER — METRONIDAZOLE 250 MG/1
TABLET ORAL
COMMUNITY
Start: 2024-09-24

## 2024-10-14 NOTE — PATIENT INSTRUCTIONS
1.  If the pain continues begin the ciprofloxacin and metronidazole antibiotics twice daily for a total of 2 weeks.  2.  No alcohol during the above treatment.  3.  Schedule CT scan of the abdomen and pelvis.  4.  Further advice pending the above CT result.

## 2024-10-21 ENCOUNTER — HOSPITAL ENCOUNTER (OUTPATIENT)
Dept: CT IMAGING | Facility: HOSPITAL | Age: 59
Discharge: HOME | End: 2024-10-21
Attending: INTERNAL MEDICINE
Payer: COMMERCIAL

## 2024-10-21 DIAGNOSIS — R10.32 LLQ PAIN: ICD-10-CM

## 2024-10-21 LAB
CREAT BLD-MCNC: 1.2 MG/DL
EGFRCR SERPLBLD CKD-EPI 2021: 69 ML/MIN/1.73M2 (ref 60–?)

## 2024-10-21 PROCEDURE — 82565 ASSAY OF CREATININE: CPT

## 2024-10-21 PROCEDURE — 74177 CT ABD & PELVIS W/CONTRAST: CPT | Performed by: INTERNAL MEDICINE

## 2025-01-11 ENCOUNTER — PATIENT MESSAGE (OUTPATIENT)
Facility: CLINIC | Age: 60
End: 2025-01-11

## 2025-01-13 RX ORDER — PANTOPRAZOLE SODIUM 40 MG/1
40 TABLET, DELAYED RELEASE ORAL
Qty: 90 TABLET | Refills: 3 | Status: SHIPPED | OUTPATIENT
Start: 2025-01-13

## 2025-01-13 NOTE — TELEPHONE ENCOUNTER
Last Office Visit: 10/14/2024       Last Refill: 8/6/2024    Procedure:  Colonoscopy and Esophagogastroduodenoscopy (EGD) 7/25/2019      Requested Prescriptions     Pending Prescriptions Disp Refills    pantoprazole 40 MG Oral Tab EC 90 tablet 3     Sig: Take 1 tablet (40 mg total) by mouth daily with food.

## 2025-01-15 RX ORDER — PANTOPRAZOLE SODIUM 40 MG/1
40 TABLET, DELAYED RELEASE ORAL
Qty: 180 TABLET | Refills: 3 | Status: SHIPPED | OUTPATIENT
Start: 2025-01-15 | End: 2025-01-21 | Stop reason: SDUPTHER

## 2025-01-15 NOTE — TELEPHONE ENCOUNTER
Dr Espino    The patient is requesting new prescription for pantoprazole BID as per the [employer] message.  Last refill: 1/13/2025       D: 90      R:3    Last office visit: 10/14/2024    2. Gastroesophageal reflux disease with esophagitis without hemorrhage  Complicated by erosive esophagitis and a Schatzki ring.  The patient should continue pantoprazole maintenance either once or twice daily as needed to maintain symptom relief.           Zenon REYES [facility] Clinical Staff (supporting Srinivas Staton MD)4 days ago     BL  Good morningDr. ARELLANO.  I think we discussed the refill of the above meds.  I indicated i was having to take 2 of these in the morning.  the prescription is still for 40 mg but only once per day.  Should this be modified?  Otherwise I will run out.  Thanks.  Zenon

## 2025-01-21 RX ORDER — PANTOPRAZOLE SODIUM 40 MG/1
40 TABLET, DELAYED RELEASE ORAL
Qty: 180 TABLET | Refills: 3 | Status: SHIPPED | OUTPATIENT
Start: 2025-01-21

## 2025-01-21 NOTE — TELEPHONE ENCOUNTER
Called and spoke to the patient, date of birth and name verified.    He stated that [facility] is not his insurance preferred pharmacy. He requested to send pantoprazole prescription to [facility].    Prescription sent to [facility].

## 2025-04-26 ENCOUNTER — PATIENT MESSAGE (OUTPATIENT)
Facility: CLINIC | Age: 60
End: 2025-04-26

## 2025-04-28 ENCOUNTER — TELEPHONE (OUTPATIENT)
Facility: CLINIC | Age: 60
End: 2025-04-28

## 2025-04-28 DIAGNOSIS — R10.32 LLQ PAIN: Primary | ICD-10-CM

## 2025-04-28 NOTE — TELEPHONE ENCOUNTER
Dr Espino    Called and spoke to the patient, date of birth and name verified.    As per the patient he has been experiencing left mid abdominal pain since Friday. He also had a low grade fever of 99.6. He also sent a [employer] message, posted below.    Currently, his pain score is 2 out of 10 but was \"bad\" an hour ago, 5/10. He took a dose of ibuprofen which helped.    The patient started Cipro and Flagyl 2 days ago, for a total of 5 doses, Rx from last office visit on 10/14/2025.    He denies nausea/vomiting, constipation or urinary issues.  He is leaving for [place] this Thursday.    The patient wants to know if he should be seen in the office or continue to take the antibiotics?    Advised to avoid ibuprofen/NSAIDs, keep a soft diet and should go to the ED if having severe abdominal pain.  He agreed and verbalized understanding.    Thank you            Zenno Fernandez to [facility] Clinical Staff (supporting Srinivas Staton MD)        4/28/25  9:35 AM    I have been taking the meds for approximately 48 hours (5 doses).  I remain in a relatively high level of abdominal pain.  Should i come to the emergency room?  You office?  Wait?  I am leaving for [place] on Thursday and hope to at least have the pain subside by then.  Zenon Fernandez to [facility] Clinical Staff (supporting Srinivas Staton MD)        4/26/25 10:46 AM  Good morning   I believe i am having another Diverticulitis attach.  12 hours of abdomen paid and now low grade fever.  I am going to start the meds you prescribed last year.  However I didn't realize one of them is Cipro. I doubt i can be on that for any length of time because it effects my achillis tendons after a few days.  In addition i am heading to [facility] on Thursday.  I am starting on this now but if there is a different antibiotic or I should just take the Flagyl please let me know.     Zenon

## 2025-04-28 NOTE — TELEPHONE ENCOUNTER
I spoke to Zenon.  He developed abdominal pain typical of diverticulitis on Friday.  He began ciprofloxacin and metronidazole on Saturday.  He continues to experience pain and a low-grade fever of 99.6.  He took ibuprofen.  He is feeling better now.  I have recommended that he downgrade his diet to clear liquids.  He may take acetaminophen but avoid NSAIDs.  Will reassess tomorrow.  If symptoms do not begin to improve we will proceed with a CT scan of the abdomen and pelvis.

## 2025-04-28 NOTE — TELEPHONE ENCOUNTER
Patient is following up on patient message sent is having symptoms. Patient is having stomach pain please call

## 2025-04-29 ENCOUNTER — HOSPITAL ENCOUNTER (OUTPATIENT)
Dept: CT IMAGING | Facility: HOSPITAL | Age: 60
Discharge: HOME | End: 2025-04-29
Attending: INTERNAL MEDICINE
Payer: COMMERCIAL

## 2025-04-29 DIAGNOSIS — R10.32 LLQ PAIN: ICD-10-CM

## 2025-04-29 PROCEDURE — 74177 CT ABD & PELVIS W/CONTRAST: CPT | Performed by: INTERNAL MEDICINE

## 2025-04-29 PROCEDURE — 82565 ASSAY OF CREATININE: CPT

## 2025-04-29 NOTE — TELEPHONE ENCOUNTER
I spoke to Radha from the CT department.  She gave an ok for the patient to come, no need to schedule with Central scheduling.  Can fit him in today.  The patient may come to the main registration desk at the [facility].      Called and spoke to the patient, date of birth and name verified.  Instructed NPO now and informed of the above.    He is in agreement and will have it done today.

## 2025-04-29 NOTE — TELEPHONE ENCOUNTER
I spoke to Zenon.  His CT scan reveals uncomplicated sigmoid colon diverticulitis.  He is feeling a little bit better.  He has had no fever.  The pain lingers.  We discussed the options of continued ciprofloxacin and metronidazole versus switching to an alternative antibiotic.  Although he has an allergy to cefazolin he endorses that he has taken amoxicillin in the past without issue.  He is concerned about an Achilles tendon rupture on prolonged ciprofloxacin especially since he will be traveling to [place].  I am sending in a prescription for Augmentin to be taken twice daily for 10 days.  I would continue clear liquids today and if better advance to a low residue diet tomorrow.  May advance to a higher fiber diet once the antibiotics are completed.  He will contact me if he does not continue to improve.

## 2025-04-29 NOTE — TELEPHONE ENCOUNTER
Zenon contacted me this morning.  Although his pain is slightly better and he has no fever, the pain has not completely resolved and is rated 3/10.  We discussed options and have elected a stat CT scan of the abdomen pelvis as the patient will be traveling to [place] later this week.    GI RNs: Please arrange a stat CT scan of the abdomen and pelvis for left lower quadrant abdominal pain.

## 2025-04-30 LAB
CREAT BLD-MCNC: 1.3 MG/DL (ref 0.7–1.3)
EGFRCR SERPLBLD CKD-EPI 2021: 62 ML/MIN/1.73M2 (ref 60–?)

## 2025-07-08 ENCOUNTER — TELEPHONE (OUTPATIENT)
Facility: CLINIC | Age: 60
End: 2025-07-08

## 2025-07-08 RX ORDER — PANTOPRAZOLE SODIUM 40 MG/1
40 TABLET, DELAYED RELEASE ORAL
Qty: 90 TABLET | Refills: 3 | Status: SHIPPED | OUTPATIENT
Start: 2025-07-08

## 2025-07-08 NOTE — TELEPHONE ENCOUNTER
Pharmacy note: Please send a replace/new response with 90-Day Supply if appropriate to maximize member benefit. Requesting 1 year supply.     Last Office Visit: 10/14/2024       Last Refill: 1/21/2025    Procedure:  Colonoscopy  03/17/2023     Requested Prescriptions     Pending Prescriptions Disp Refills    PANTOPRAZOLE 40 MG Oral Tab EC [Pharmacy Med Name: Pantoprazole Sodium 40 MG Oral Tablet Delayed Release] 90 tablet 3     Sig: TAKE 1 TABLET BY MOUTH DAILY  WITH FOOD

## 2025-07-08 NOTE — TELEPHONE ENCOUNTER
The patient's Augmentin prescription was inadvertently sent to a pharmacy in [place], [place].  The patient has never been to this pharmacy.  I resent the prescription to the [facility] in [place], [place].    GI RNs: Please remove the [place] pharmacy from the patient's chart.

## 2025-07-28 ENCOUNTER — OFFICE VISIT (OUTPATIENT)
Facility: CLINIC | Age: 60
End: 2025-07-28
Payer: COMMERCIAL

## 2025-07-28 VITALS
WEIGHT: 181 LBS | BODY MASS INDEX: 26.81 KG/M2 | HEART RATE: 77 BPM | DIASTOLIC BLOOD PRESSURE: 80 MMHG | SYSTOLIC BLOOD PRESSURE: 151 MMHG | HEIGHT: 69 IN

## 2025-07-28 DIAGNOSIS — R10.32 LLQ PAIN: ICD-10-CM

## 2025-07-28 DIAGNOSIS — K57.32 DIVERTICULITIS, COLON: Primary | ICD-10-CM

## 2025-07-28 PROCEDURE — 99213 OFFICE O/P EST LOW 20 MIN: CPT | Performed by: INTERNAL MEDICINE

## 2025-07-28 RX ORDER — CELECOXIB 200 MG/1
CAPSULE ORAL
COMMUNITY
Start: 2025-04-30

## 2025-07-28 NOTE — PATIENT INSTRUCTIONS
1.  Schedule CT scan of the abdomen and pelvis.  2.  If you have a recurrent episode of diverticulitis downgrade your diet and begin the antibiotic and let me know.

## 2025-07-28 NOTE — PROGRESS NOTES
Subjective:   Patient ID: Zenon Fernandez is a 60 year old male.    HPI  Zenon returns in follow-up.  He was last seen in October 2024.       As per previous notes Zenon has a history of gastroesophageal reflux with dysphagia.  Endoscopy revealed peptic esophagitis and a Schatzki ring which was dilated.  He has been maintained on PPI therapy.     Zenon also has a history of adenomatous colon polyps including a recalcitrant sessile serrated adenoma at the appendiceal orifice.  His most recent procedure was performed on March 17, 2023 with removal of a small residual serrated adenoma at the appendiceal orifice.  The remainder of the periappendiceal biopsies were negative.  A 1 year surveillance examination was advised, however, 3 weeks later the patient developed right lower quadrant abdominal pain with imaging findings suggesting acute appendicitis.  An appendectomy was performed with findings of acute appendicitis and no residual sessile serrated adenoma.  A 3-year surveillance colonoscopy has been advised (March 2026).     Zenon has had previous episodes of a fever of unknown origin (temperature as high as 100 degrees).  He has been treated for diverticulitis by Dr. Muñoz in the past.  Last evaluation by Dr. Muñoz has included a negative CT scan of the chest, abdomen and pelvis on January 19, 2024 (diverticulosis without diverticulitis) and a negative gallium scan on April 18, 2024.    Zenon endorsed #2 episodes of left mid quadrant abdominal pain (8/19 and 9/24).  He was empirically treated with 7 days of cefuroxime and metronidazole.  His pain improved within 36-48 hours.  He finished both courses of antibiotics.  He described these episodes as \"as painful as my appendicitis\".     Zenon was evaluated in October 2024 for recurrent symptoms of left-sided abdominal pain.  He did not require antibiotics at that time.  A CT scan of the abdomen and pelvis revealed diverticulosis without diverticulitis.    Zenon  contacted us in April 2025 for recurrent symptoms suggestive of diverticulitis (left sided pain and low-grade fever).  He downgraded his diet and began ciprofloxacin and metronidazole.  Persistent symptoms prompted a repeat CT scan which revealed \"acute uncomplicated diverticulitis of the mid sigmoid colon.  No evidence of bowel perforation.  No organized measurable fluid collection\".  The patient has difficulty tolerating ciprofloxacin for more than a few days due to Achilles tendon pain.  He was treated with a course of Augmentin with symptom improvement.    Zenon had a recurrent episode of lower abdominal pain in June 2025 that required prolonged courses of antibiotics.    Current history:  Zenon returns today and endorses constant discomfort in the left lower quadrant.  The symptoms improve slightly with having a bowel movement.  He feels that his symptoms are 80% better after the antibiotic courses.  His bowel movements are normal.    Zenon endorses that the last course of Augmentin \"took forever\".  As mentioned above he is only able to tolerate Cipro for a few days as it causes Achilles pain.    Zenon is concerned in that he will [place] upcoming [place] and to [place] on August 22 for 1 week.    Zenon wonders whether he should have surgery to remove the sigmoid colon as he has an acquaintance who developed acute diverticulitis with perforation requiring a Cristian's procedure.      History/Other:   Review of Systems  See above    Wt Readings from Last 7 Encounters:   07/28/25 181 lb (82.1 kg)   10/14/24 183 lb (83 kg)   03/14/24 184 lb (83.5 kg)   04/12/23 177 lb 8 oz (80.5 kg)   03/21/22 175 lb (79.4 kg)   03/14/22 177 lb (80.3 kg)   03/14/22 177 lb (80.3 kg)       Current Medications[1]  Allergies:Allergies[2]    Objective:   Physical Exam  Vitals and nursing note reviewed.   Constitutional:       General: He is not in acute distress.     Appearance: He is well-developed. He is not  ill-appearing, toxic-appearing or diaphoretic.   HENT:      Mouth/Throat:      Pharynx: No oropharyngeal exudate.   Eyes:      General: No scleral icterus.     Conjunctiva/sclera: Conjunctivae normal.   Neck:      Thyroid: No thyromegaly.   Cardiovascular:      Rate and Rhythm: Normal rate and regular rhythm.      Heart sounds: Normal heart sounds. No murmur heard.  Pulmonary:      Effort: Pulmonary effort is normal. No respiratory distress.      Breath sounds: Normal breath sounds. No wheezing or rales.   Abdominal:      General: Bowel sounds are normal. There is no distension.      Palpations: Abdomen is soft. There is no mass.      Tenderness: There is abdominal tenderness (Mild direct left-sided tenderness). There is no guarding or rebound.   Musculoskeletal:      Cervical back: Neck supple.   Lymphadenopathy:      Cervical: No cervical adenopathy.   Neurological:      Mental Status: He is alert and oriented to person, place, and time.   Psychiatric:         Behavior: Behavior normal.       PROCEDURE: CT ABDOMEN + PELVIS (CONTRAST ONLY) (CPT=74177)     COMPARISON: [facility], CT ABDOMEN + PELVIS (CONTRAST ONLY) (CPT=74177), 10/21/2024, 1:49 PM.     INDICATIONS: R10.32 LLQ pain     TECHNIQUE: CT images of the abdomen and pelvis were obtained with non-ionic intravenous contrast material.  Automated exposure control for dose reduction was used. Adjustment of the mA and/or kV was done based on the patient's size. Use of iterative  reconstruction technique for dose reduction was used.  Dose information is transmitted to the ACR (American College of Radiology) NRDR (National Radiology Data Registry) which includes the Dose Index Registry.     FINDINGS:  LIVER: No enlargement, atrophy, abnormal density, or significant focal lesion.    BILIARY: The gallbladder is present.  No intra- or extrahepatic biliary ductal dilatation.  SPLEEN: No enlargement or focal lesion.    STOMACH: No gross gastric  mass, obstruction or focal abnormality.  Duodenum unremarkable.  PANCREAS: No lesion, fluid collection, ductal dilatation, or atrophy.    ADRENALS: No nodule or enlargement.  KIDNEYS: Both kidneys have a slight outward/anterior anatomic position/orientation, anatomic variation.  The mid-lower pole of the left kidney there is a 21 mm diameter low-density cortical based focus compatible with cyst.  Additional smaller cortical  based low density foci also compatible with cysts are present in the kidneys the.  No enhancing renal lesion or hydronephrosis.  AORTA/VASCULAR:   Atherosclerosis of the abdominal aorta.  No aneurysm.  Each kidney has an accessory artery.  RETROPERITONEUM: No inflammatory changes or lymphadenopathy  BOWEL/MESENTERY: There is localized wall thickening and diverticular inflammation in the proximal-mid sigmoid colon (series 2, image 97). There is mesenteric fat stranding and free fluid around the inflamed colon, with additional diverticula seen  proximal and distal to this finding. There is no small or large bowel obstruction. Terminal ileum is unremarkable.  Clips/suture at the base of the cecum the appendix is absent compatible with previous appendectomy.  Trace free fluid in the left lower  quadrant adjacent to the proximal sigmoid colon.  There is no organized focal fluid collection, evidence of bowel necrosis, or evidence of bowel perforation.  ABDOMINAL WALL: Small fat containing right inguinal hernia.  URINARY BLADDER: No visible focal wall thickening, lesion or calculus.    PELVIC NODES: No enlarged mass or adenopathy.    PELVIC ORGANS: The prostate has normal size.  There are metallic clips/markers within the prostate  BONES:   Right hip arthroplasty is present causing localized beam hardening artifact limiting assessment of adjacent structures.  Heterotopic calcifications adjacent to the right greater trochanter.  Scattered mild degenerative endplate change within the   spine.  LUNG  BASES: Dependent subsegmental atelectasis in the lung bases.  The visualized heart has normal size.  No visible coronary artery calcifications.  OTHER: Negative.                 Impression   CONCLUSION:  1.  Acute uncomplicated diverticulitis of the mid sigmoid colon.  No evidence of bowel perforation.  No organized measurable fluid collection.  2.  Post appendectomy.  3.  Renal cysts.  4.  Postprocedural changes within the prostate.  No pelvic or retroperitoneal lymphadenopathy.  No suspicious bone lesion.  5.  Post right hip arthroplasty.  The hardware causes beam hardening artifact limiting assessment of adjacent structures.  6.  Small fat containing right inguinal hernia.       Dictated by (CST): Boby Vaughn MD on 4/29/2025 at 12:26 PM      Finalized by (CST): Boby Vaughn MD on 4/29/2025 at 12:34 PM           Assessment & Plan:   1. Diverticulitis, colon    2. LLQ pain    Zenon presents with multiple episodes of left-sided abdominal pain, some with low-grade fever.  Some episodes have revealed CT documented inflammation and others have not.  It is difficult to ascertain whether the patient has been experiencing recurrent episodes of acute diverticulitis or \"smoldering\" diverticulitis.  Based on the persistent pain, I am recommending a CT scan of the abdomen and pelvis to evaluate for diverticulitis.  The patient was given an additional prescription for a 10-day course of Augmentin should he develop symptoms while traveling.  He will also downgrade his diet if this occurs.  He will also contact me if this occurs.  We discussed that it is impossible to determine whether he will have additional episodes of diverticulitis in the future and if so their severity.  Operative intervention is not conclusively recommended for uncomplicated episodes of diverticulitis, however, if the episodes are frequent or if the patient is deemed to have smoldering diverticulitis, resection should be strongly considered.  A surgical  consultation at some point would not be unreasonable.  I will contact Zenon after the results of his CT scan.  Further recommendations pending this result and clinical course.      Meds This Visit:  Requested Prescriptions     Signed Prescriptions Disp Refills    amoxicillin clavulanate 875-125 MG Oral Tab 20 tablet 0     Sig: Take 1 tablet by mouth 2 (two) times daily.       Imaging & Referrals:  CT ABDOMEN+PELVIS(CONTRAST ONLY)(CPT=74177)         [1]   Current Outpatient Medications   Medication Sig Dispense Refill    celecoxib 200 MG Oral Cap TAKE ONE CAPSULE BY MOUTH TWICE DAILY AS NEEDED FOR LOWER BACK PAIN      amoxicillin clavulanate 875-125 MG Oral Tab Take 1 tablet by mouth 2 (two) times daily. 20 tablet 0    PANTOPRAZOLE 40 MG Oral Tab EC TAKE 1 TABLET BY MOUTH DAILY  WITH FOOD 90 tablet 3    rosuvastatin 10 MG Oral Tab Take 1 tablet (10 mg total) by mouth daily.      ADVAIR DISKUS 250-50 MCG/DOSE Inhalation Aerosol Powder, Breath Activated Inhale 1 puff into the lungs daily.      amoxicillin clavulanate 875-125 MG Oral Tab Take 1 tablet by mouth 2 (two) times daily. (Patient not taking: Reported on 7/28/2025) 10 tablet 0    amoxicillin clavulanate 875-125 MG Oral Tab Take 1 tablet by mouth 2 (two) times daily. Take every 12 hours (Patient not taking: Reported on 7/28/2025) 20 tablet 0    metRONIDAZOLE 250 MG Oral Tab  (Patient not taking: Reported on 7/28/2025)      ciprofloxacin 500 MG Oral Tab Take 1 tablet (500 mg total) by mouth 2 (two) times daily. Take every 12 hours (Patient not taking: Reported on 7/28/2025) 28 tablet 0    metRONIDAZOLE 500 MG Oral Tab Take 1 tablet (500 mg total) by mouth in the morning and 1 tablet (500 mg total) before bedtime. 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

## 2025-08-16 ENCOUNTER — TELEPHONE (OUTPATIENT)
Facility: CLINIC | Age: 60
End: 2025-08-16

## (undated) DEVICE — 20 ML SYRINGE LUER-LOCK TIP: Brand: MONOJECT

## (undated) DEVICE — SKIN AFFIX .4ML

## (undated) DEVICE — STERILE POLYISOPRENE POWDER-FREE SURGICAL GLOVES: Brand: PROTEXIS

## (undated) DEVICE — SOL  .9 3000ML

## (undated) DEVICE — GUIDEWIRE ORTH 100CM 3MM TIB

## (undated) DEVICE — ENDOPATH ETS45 2.5MM RELOADS (VASCULAR/THIN): Brand: ENDOPATH

## (undated) DEVICE — CONTAINER SPEC STR 4OZ GRY LID

## (undated) DEVICE — PACK CDS TOTAL HIP

## (undated) DEVICE — 2T9 -0- UNDYED MONODERM 36X36: Brand: 2T9 -0- UNDYED MONODERM 36X36

## (undated) DEVICE — LAP CHOLE: Brand: MEDLINE INDUSTRIES, INC.

## (undated) DEVICE — STERILE LATEX POWDER-FREE SURGICAL GLOVES WITH HYDROGEL COATING, SMOOTH FINISH, STRAIGHT FINGER: Brand: PROTEXIS

## (undated) DEVICE — PEN: MARKING STD PT 100/CS: Brand: MEDICAL ACTION INDUSTRIES

## (undated) DEVICE — SUT VICRYL 0 UR-6 J603H

## (undated) DEVICE — TROCAR: Brand: KII® SLEEVE

## (undated) DEVICE — SINGLE-USE SYRINGE/GAUGE ASSEMBLY: Brand: ALLIANCE II

## (undated) DEVICE — PLUMEPORT ACTIV LAPAROSCOPIC SMOKE FILTRATION DEVICE: Brand: PLUMEPORT ACTIVE

## (undated) DEVICE — DERMABOND CLOSURE 0.7ML TOPICL

## (undated) DEVICE — Device: Brand: CUSTOM PROCEDURE KIT

## (undated) DEVICE — STERILE LATEX POWDER-FREE SURGICAL GLOVESWITH NITRILE COATING: Brand: PROTEXIS

## (undated) DEVICE — T5 HOOD WITH PEEL AWAY FACE SHIELD

## (undated) DEVICE — BLADE ELECTRODE: Brand: EDGE

## (undated) DEVICE — SPONGE LAP 18X18 XRAY STRL

## (undated) DEVICE — COVER TABLE BACK PADDED HVY DU

## (undated) DEVICE — TRAY SRGPRP PVP IOD WT SCRB SM

## (undated) DEVICE — TISSUE RETRIEVAL SYSTEM: Brand: INZII RETRIEVAL SYSTEM

## (undated) DEVICE — 7.5MM ACORN

## (undated) DEVICE — ADHV SKIN LIQUIBAND

## (undated) DEVICE — TROCAR: Brand: KII FIOS FIRST ENTRY

## (undated) DEVICE — 2T8 #2 PDO 24 X 24: Brand: 2T8 #2 PDO 24 X 24

## (undated) DEVICE — 5 MM BABCOCKS WITH RATCHET HANDLES: Brand: ENDOPATH

## (undated) DEVICE — CULTURE TUBE ANAEROBIC

## (undated) DEVICE — BATTERY

## (undated) DEVICE — SOL  0.9% 1000ML

## (undated) DEVICE — DRAPE CASSETTE X-RAY

## (undated) DEVICE — DUAL CUT SAGITTAL BLADE

## (undated) DEVICE — 2T11 #2 PDO 36 X 36: Brand: 2T11 #2 PDO 36 X 36

## (undated) DEVICE — TROCARS: Brand: KII® BALLOON BLUNT TIP SYSTEM

## (undated) DEVICE — BOWL CEMENT MIX QUICK-VAC

## (undated) DEVICE — Device

## (undated) DEVICE — PROXIMATE SKIN STAPLERS (35 WIDE) CONTAINS 35 STAINLESS STEEL STAPLES (FIXED HEAD): Brand: PROXIMATE

## (undated) DEVICE — SOLUTION SURG DURA PREP HAZMAT

## (undated) DEVICE — SUTURE PDS II 1 CT-1

## (undated) DEVICE — 3M™ TEGADERM™ TRANSPARENT FILM DRESSING, 1626W, 4 IN X 4-3/4 IN (10 CM X 12 CM), 50 EACH/CARTON, 4 CARTON/CASE: Brand: 3M™ TEGADERM™

## (undated) DEVICE — Device: Brand: DEFENDO AIR/WATER/SUCTION AND BIOPSY VALVE

## (undated) DEVICE — TRAP 4 CPTR CHMBR N EZ INLN

## (undated) DEVICE — UNDYED BRAIDED (POLYGLACTIN 910), SYNTHETIC ABSORBABLE SUTURE: Brand: COATED VICRYL

## (undated) DEVICE — BLADE SAW SAGITTAL 19.5

## (undated) DEVICE — DRAPE SHEET LG

## (undated) DEVICE — GAUZE SPONGES,12 PLY: Brand: CURITY

## (undated) DEVICE — SUTURE ETHIBOND 5-0 MB46G

## (undated) DEVICE — 35 ML SYRINGE REGULAR TIP: Brand: MONOJECT

## (undated) DEVICE — MEDI-VAC NON-CONDUCTIVE SUCTION TUBING 6MM X 1.8M (6FT.) L: Brand: CARDINAL HEALTH

## (undated) DEVICE — NEEDLE HPO 18GA 1.5IN ECLPS

## (undated) DEVICE — CATH BALLOON CRE 15-18MM 5837

## (undated) DEVICE — 3M(TM) TEGADERM(TM) TRANSPARENT FILM DRESSING FRAME STYLE 1628: Brand: 3M™ TEGADERM™

## (undated) DEVICE — POUCH: SSEAL TYVEK 2000/CS: Brand: MEDICAL ACTION INDUSTRIES

## (undated) DEVICE — FAN SPRAY KIT: Brand: PULSAVAC®

## (undated) DEVICE — CONMED SCOPE SAVER BITE BLOCK, 20X27 MM: Brand: SCOPE SAVER

## (undated) DEVICE — CULTURE COLLECT/TRANSPORT SYS

## (undated) DEVICE — 3M™ STERI-DRAPE™ U-DRAPE 1015: Brand: STERI-DRAPE™

## (undated) DEVICE — Device: Brand: JELCO

## (undated) DEVICE — TRAY FOLEY BDX 16F STATLOCK

## (undated) DEVICE — LINE MNTR ADLT SET O2 INTMD

## (undated) DEVICE — SOL NACL IRRIG 0.9% 1000ML BTL

## (undated) DEVICE — PRECISION (9.0 X 0.51 X 31.0MM)

## (undated) DEVICE — BIT DRL 30MM 3.2MM RNGLC ACTB

## (undated) DEVICE — SNARE CAPTIFLEX MICRO-OVL OLY

## (undated) DEVICE — GOWN SURG AERO BLUE PERF XLG

## (undated) DEVICE — SOL  .9 1000ML BTL

## (undated) DEVICE — 3M™ MEDITPORE™ SOFT CLOTH TAPE 6 IN X 10 YD 12 ROLLS/CASE 2966: Brand: 3M™ MEDIPORE™

## (undated) DEVICE — ENDOPATH ETS-FLEX45 ARTICULATING ENDOSCOPIC LINEAR CUTTER, NO RELOAD: Brand: ENDOPATH

## (undated) DEVICE — GAMMEX® PI HYBRID SIZE 6.5, STERILE POWDER-FREE SURGICAL GLOVE, POLYISOPRENE AND NEOPRENE BLEND: Brand: GAMMEX

## (undated) DEVICE — KIT DRN 1/8IN PVC 3 SPRG EVAC

## (undated) DEVICE — SUTURE ETHIBOND 2 V-37

## (undated) DEVICE — ETS45 RELOAD STANDARD 45MM: Brand: ENDOPATH

## (undated) DEVICE — FORCEP RADIAL JAW 4

## (undated) DEVICE — OCCLUSIVE GAUZE STRIP,3% BISMUTH TRIBROMOPHENATE IN PETROLATUM BLEND: Brand: XEROFORM

## (undated) DEVICE — [HIGH FLOW INSUFFLATOR,  DO NOT USE IF PACKAGE IS DAMAGED,  KEEP DRY,  KEEP AWAY FROM SUNLIGHT,  PROTECT FROM HEAT AND RADIOACTIVE SOURCES.]: Brand: PNEUMOSURE

## (undated) DEVICE — NON-ADHERENT PADS PREPACK: Brand: TELFA

## (undated) NOTE — LETTER
12/9/2022    Sarah Fernandez        [address] [phone]            Dear Sarah Fernandez,      Our records indicate that you are due for an appointment for a Colonoscopy with Cait Talbot MD. Our doctors are booking out about 3-5 months for procedures. Please call our office to schedule a phone screening appointment to plan for the procedure(s). Your medical well-being is important to us. If your insurance requires a referral, please call your primary care office to request one.       Thank you,      The Physicians and Staff at [facility]

## (undated) NOTE — LETTER
12/6/2017              Bethany Fernandez        [address]         [address] [address]         Dear Shantelle Gupta,    As you know, regular medical attention is essential to maintaining your health.   Please contact the office at your earliest convenience to madi

## (undated) NOTE — LETTER
[facility]  Administration of Anesthesia  Zenon CHEN agree to be cared for by a physician anesthesiologist alone and/or with a nurse anesthetist, who is specially trained to monitor me and give me medicine to put me to sleep or keep me comfortable during my procedure    I understand that my anesthesiologist and/or anesthetist is not an employee or agent of [facility] or [employer]. He or she works for [facility]    As the patient asking for anesthesia services, I agree to:  Allow the anesthesiologist (anesthesia doctor) to give me medicine and do additional procedures as necessary. Some examples are: Starting or using an “IV” to give me medicine, fluids or blood during my procedure, and having a breathing tube placed to help me breathe when I’m asleep (intubation). In the event that my heart stops working properly, I understand that my anesthesiologist will make every effort to sustain my life, unless otherwise directed by [facility] Do Not Resuscitate documents.  Tell my anesthesia doctor before my procedure:  If I am pregnant.  The last time that I ate or drank.  iii. All of the medicines I take (including prescriptions, herbal supplements, and pills I can buy without a prescription (including street drugs/illegal medications). Failure to inform my anesthesiologist about these medicines may increase my risk of anesthetic complications.  iv.If I am allergic to anything or have had a reaction to anesthesia before.  I understand how the anesthesia medicine will help me (benefits).  I understand that with any type of anesthesia medicine there are risks:  The most common risks are: nausea, vomiting, sore throat, muscle soreness, damage to my eyes, mouth, or teeth (from breathing tube placement).  Rare risks include: remembering what happened during my procedure, allergic reactions to medications, injury to my airway, heart, lungs, vision, nerves, or  muscles and in extremely rare instances death.  My doctor has explained to me other choices available to me for my care (alternatives).  Pregnant Patients (“epidural”):  I understand that the risks of having an epidural (medicine given into my back to help control pain during labor), include itching, low blood pressure, difficulty urinating, headache or slowing of the baby’s heart. Very rare risks include infection, bleeding, seizure, irregular heart rhythms and nerve injury.  Regional Anesthesia (“spinal”, “epidural”, & “nerve blocks”):  I understand that rare but potential complications include headache, bleeding, infection, seizure, irregular heart rhythms, and nerve injury.    _____________________________________________________________________________  Patient (or Representative) Signature/Relationship to Patient  Date   Time    _____________________________________________________________________________   Name (if used)    Language/Organization   Time    _____________________________________________________________________________  Nurse Anesthetist Signature     Date   Time  _____________________________________________________________________________  Anesthesiologist Signature     Date   Time  I have discussed the procedure and information above with the patient (or patient’s representative) and answered their questions. The patient or their representative has agreed to have anesthesia services.    _____________________________________________________________________________  Witness        Date   Time  I have verified that the signature is that of the patient or patient’s representative, and that it was signed before the procedure  Patient Name: Zenon Fernandez     : 1965                 Printed: 3/8/2024 at 3:15 PM    Medical Record #: [id number]                                            Page 1 of 1  ----------ANESTHESIA CONSENT----------

## (undated) NOTE — LETTER
5/4/2021    [address] [phone]            Dear Nneka Fernandez,      Our records indicate that you are due for an appointment for a Colonoscopy in July 2021, or shortly there after, with Billie Engle MD.